# Patient Record
Sex: FEMALE | Race: WHITE | Employment: UNEMPLOYED | URBAN - METROPOLITAN AREA
[De-identification: names, ages, dates, MRNs, and addresses within clinical notes are randomized per-mention and may not be internally consistent; named-entity substitution may affect disease eponyms.]

---

## 2018-01-22 ENCOUNTER — HOSPITAL ENCOUNTER (EMERGENCY)
Age: 23
Discharge: HOME | End: 2018-01-22
Payer: MEDICAID

## 2018-01-22 VITALS
HEART RATE: 113 BPM | BODY MASS INDEX: 41.65 KG/M2 | DIASTOLIC BLOOD PRESSURE: 77 MMHG | RESPIRATION RATE: 20 BRPM | HEIGHT: 65 IN | TEMPERATURE: 98 F | WEIGHT: 250 LBS | OXYGEN SATURATION: 100 % | SYSTOLIC BLOOD PRESSURE: 130 MMHG

## 2018-01-22 DIAGNOSIS — M54.50 CHRONIC BILATERAL LOW BACK PAIN WITHOUT SCIATICA: Primary | ICD-10-CM

## 2018-01-22 DIAGNOSIS — G89.29 CHRONIC BILATERAL LOW BACK PAIN WITHOUT SCIATICA: Primary | ICD-10-CM

## 2018-01-22 PROCEDURE — 99283 EMERGENCY DEPT VISIT LOW MDM: CPT

## 2018-01-22 RX ORDER — CYCLOBENZAPRINE HCL 5 MG
5 TABLET ORAL 3 TIMES DAILY PRN
Qty: 15 TABLET | Refills: 0 | Status: SHIPPED | OUTPATIENT
Start: 2018-01-22 | End: 2018-02-02 | Stop reason: ALTCHOICE

## 2018-01-22 RX ORDER — SERTRALINE HYDROCHLORIDE 100 MG/1
100 TABLET, FILM COATED ORAL DAILY
COMMUNITY
End: 2018-05-01 | Stop reason: ALTCHOICE

## 2018-01-22 RX ORDER — METHYLPREDNISOLONE 4 MG/1
TABLET ORAL
Qty: 1 PACKAGE | Refills: 0 | Status: SHIPPED | OUTPATIENT
Start: 2018-01-22 | End: 2018-01-27

## 2018-01-23 NOTE — ED PROVIDER NOTES
Patient Seen in: THE [facility] Emergency Department In [place]    History   Patient presents with:  Back Pain (musculoskeletal)    Stated Complaint: back pain     14-year-old female who presents to the emergency room with complaints of low back pain for the noted above.     Physical Exam   ED Triage Vitals [01/22/18 9994]  BP: 130/77  Pulse: 113  Resp: 20  Temp: 97.8 °F (36.6 °C)  Temp src: Temporal  SpO2: 100 %  O2 Device: None (Room air)    Current:/77   Pulse 113   Temp 97.8 °F (36.6 °C) (Temporal) SATNAM  Counts include 234 beds at the [facility] [address]  [phone]    In 1 week  As needed, If symptoms worsen        Medications Prescribed:  Discharge Medication List as of 1/22/2018  6:08 PM    START taking these medications    methylPREDNISolone 4 MG Oral Tablet Therapy Pack  Dose

## 2018-01-24 ENCOUNTER — HOSPITAL ENCOUNTER (EMERGENCY)
Facility: HOSPITAL | Age: 23
Discharge: HOME | End: 2018-01-24
Attending: EMERGENCY MEDICINE
Payer: MEDICAID

## 2018-01-24 VITALS
SYSTOLIC BLOOD PRESSURE: 136 MMHG | RESPIRATION RATE: 18 BRPM | TEMPERATURE: 97 F | DIASTOLIC BLOOD PRESSURE: 96 MMHG | HEART RATE: 102 BPM

## 2018-01-24 DIAGNOSIS — M54.9 BACK PAIN WITHOUT RADIATION: Primary | ICD-10-CM

## 2018-01-24 PROCEDURE — 99282 EMERGENCY DEPT VISIT SF MDM: CPT

## 2018-01-25 NOTE — ED INITIAL ASSESSMENT (HPI)
Pt presents with complaint of lower back pain since Sunday pt states this is her 3rd er visit this week

## 2018-01-25 NOTE — ED PROVIDER NOTES
Patient Seen in: [facility] Emergency Department    History   Patient presents with:  Back Pain (musculoskeletal)  Patient was seen in the [facility] emergency department  Stated Complaint: Back  pain    HPI    2 days ago.   Patient was complaining o swelling or bruising. No erythema. Range of motion is limited to about 15° of flexion forward and about 30° of flexion laterally. No obvious spasm identified. No point tenderness over any spinous process.   Some mild diffuse paraspinal tenderness and te 1year-old.   I discussed with patient that if she is having difficulty finding a primary care follow-up, she may contact the ED case manager            Disposition and Plan     Clinical Impression:  Back pain without radiation  (primary encounter diagnosis

## 2018-02-02 ENCOUNTER — TELEPHONE (OUTPATIENT)
Dept: SURGERY | Facility: CLINIC | Age: 23
End: 2018-02-02

## 2018-02-02 ENCOUNTER — OFFICE VISIT (OUTPATIENT)
Dept: FAMILY MEDICINE CLINIC | Facility: CLINIC | Age: 23
End: 2018-02-02

## 2018-02-02 VITALS
HEIGHT: 65.5 IN | HEART RATE: 60 BPM | WEIGHT: 264 LBS | SYSTOLIC BLOOD PRESSURE: 118 MMHG | DIASTOLIC BLOOD PRESSURE: 76 MMHG | BODY MASS INDEX: 43.46 KG/M2

## 2018-02-02 DIAGNOSIS — M54.50 ACUTE BILATERAL LOW BACK PAIN WITHOUT SCIATICA: ICD-10-CM

## 2018-02-02 DIAGNOSIS — F32.9 REACTIVE DEPRESSION: ICD-10-CM

## 2018-02-02 DIAGNOSIS — Z00.00 ROUTINE GENERAL MEDICAL EXAMINATION AT A HEALTH CARE FACILITY: Primary | ICD-10-CM

## 2018-02-02 DIAGNOSIS — Z83.79 FAMILY HISTORY OF CELIAC DISEASE: ICD-10-CM

## 2018-02-02 DIAGNOSIS — F43.21 GRIEF REACTION: ICD-10-CM

## 2018-02-02 DIAGNOSIS — M48.061 SPINAL STENOSIS OF LUMBAR REGION WITHOUT NEUROGENIC CLAUDICATION: ICD-10-CM

## 2018-02-02 DIAGNOSIS — M51.36 DEGENERATIVE DISC DISEASE, LUMBAR: ICD-10-CM

## 2018-02-02 PROCEDURE — 99203 OFFICE O/P NEW LOW 30 MIN: CPT | Performed by: FAMILY MEDICINE

## 2018-02-02 PROCEDURE — 99385 PREV VISIT NEW AGE 18-39: CPT | Performed by: FAMILY MEDICINE

## 2018-02-02 RX ORDER — NABUMETONE 500 MG/1
500 TABLET, FILM COATED ORAL 2 TIMES DAILY PRN
Qty: 30 TABLET | Refills: 1 | Status: SHIPPED | OUTPATIENT
Start: 2018-02-02 | End: 2018-05-01 | Stop reason: ALTCHOICE

## 2018-02-02 RX ORDER — HYDROCODONE BITARTRATE AND ACETAMINOPHEN 5; 325 MG/1; MG/1
TABLET ORAL
Refills: 0 | COMMUNITY
Start: 2018-01-23 | End: 2018-05-01 | Stop reason: ALTCHOICE

## 2018-02-02 RX ORDER — TIZANIDINE 2 MG/1
TABLET ORAL
Qty: 30 TABLET | Refills: 1 | Status: SHIPPED | OUTPATIENT
Start: 2018-02-02 | End: 2018-05-01 | Stop reason: ALTCHOICE

## 2018-02-02 NOTE — PATIENT INSTRUCTIONS
-- start nabumetone as needed for back pain  -- tizanidine as needed for more severe pain (may cause drowsiness)  --coordinator will reach out to you for counseling options  --call to schedule xray and physical therapy  -- calll pain specialist for appt

## 2018-02-02 NOTE — PROGRESS NOTES
Kwaku Webb is a 24year old female who is here for Patient presents with:  Physical  Low Back Pain: Back pain started after IUD was put in       HPI:     Here for physical    Chronic back pain  -has distant history of DJD and spinal stenosis  -ne stenosis       No past surgical history on file. Social History:    Smoking status: Never Smoker                                                              Smokeless tobacco: Never Used                      Alcohol use:  Yes              Comment: occ severe pain      Nabumetone 500 MG Oral Tab 30 tablet 1      Sig: Take 1 tablet (500 mg total) by mouth 2 (two) times daily as needed for Pain.            Imaging & Referrals:  OP REFERRAL TO [facility]  OP REFERRAL PAIN MANGEMENT  SHANNON LAUREANO

## 2018-02-06 ENCOUNTER — HOSPITAL ENCOUNTER (OUTPATIENT)
Dept: GENERAL RADIOLOGY | Age: 23
Discharge: HOME | End: 2018-02-06
Attending: FAMILY MEDICINE
Payer: MEDICAID

## 2018-02-06 DIAGNOSIS — M54.50 ACUTE BILATERAL LOW BACK PAIN WITHOUT SCIATICA: ICD-10-CM

## 2018-02-06 DIAGNOSIS — M51.36 DEGENERATIVE DISC DISEASE, LUMBAR: ICD-10-CM

## 2018-02-06 PROCEDURE — 72110 X-RAY EXAM L-2 SPINE 4/>VWS: CPT | Performed by: FAMILY MEDICINE

## 2018-02-09 ENCOUNTER — OFFICE VISIT (OUTPATIENT)
Dept: PHYSICAL THERAPY | Age: 23
End: 2018-02-09
Attending: FAMILY MEDICINE
Payer: MEDICAID

## 2018-02-09 DIAGNOSIS — M54.50 ACUTE BILATERAL LOW BACK PAIN WITHOUT SCIATICA: ICD-10-CM

## 2018-02-09 DIAGNOSIS — M51.36 DEGENERATIVE DISC DISEASE, LUMBAR: ICD-10-CM

## 2018-02-09 PROCEDURE — 97110 THERAPEUTIC EXERCISES: CPT | Performed by: PHYSICAL THERAPIST

## 2018-02-09 PROCEDURE — 97162 PT EVAL MOD COMPLEX 30 MIN: CPT | Performed by: PHYSICAL THERAPIST

## 2018-02-09 NOTE — PROGRESS NOTES
SPINE EVALUATION:   Referring Physician: Dr. Darylene Hews  Diagnosis: Acute bilateral low back pain without sciatica (M54.5), Degenerative disc disease, lumbar (M51.36), Spinal stenosis of lumbar region without neurogenic claudication (M48.061) Date of Service effect (NE)        Pretest symptoms lying: prone bilateral LBP 1/10  Extension in lying (EIL)   90%   Increased left LBP 2-3/10, No worse (NW), PDM returning to prone  Repeated EIL    90%   NE    Strength: Patient is able to squat and return and heel and t over a 4-6 week period. Treatment will include: mechanical diagnosis and therapy (MDT) of the lumbar spine, therapeutic exercise, manual therapy, and home program to achieve the above goals.     Education or treatment limitation: None  Rehab Potential:good

## 2018-02-13 ENCOUNTER — OFFICE VISIT (OUTPATIENT)
Dept: PHYSICAL THERAPY | Age: 23
End: 2018-02-13
Attending: FAMILY MEDICINE
Payer: MEDICAID

## 2018-02-13 PROCEDURE — 97110 THERAPEUTIC EXERCISES: CPT | Performed by: PHYSICAL THERAPIST

## 2018-02-19 ENCOUNTER — TELEPHONE (OUTPATIENT)
Dept: FAMILY MEDICINE CLINIC | Facility: CLINIC | Age: 23
End: 2018-02-19

## 2018-02-19 NOTE — TELEPHONE ENCOUNTER
Per Dr. Hawk Tyson, the patient was informed that the x-ray showed mild disc space narrowing and to follow up in 4 weeks as planned. Patient verbalized understanding, and she states that she will call back to schedule the follow up appointment at a later time.

## 2018-02-19 NOTE — TELEPHONE ENCOUNTER
----- Message from Ankush Fountain MD sent at 2/15/2018 12:59 PM CST -----  Mild disc space narrowing on xray   -followup in 4 wks as planned

## 2018-02-20 ENCOUNTER — OFFICE VISIT (OUTPATIENT)
Dept: PHYSICAL THERAPY | Age: 23
End: 2018-02-20
Attending: FAMILY MEDICINE
Payer: MEDICAID

## 2018-02-20 PROCEDURE — 97110 THERAPEUTIC EXERCISES: CPT | Performed by: PHYSICAL THERAPIST

## 2018-02-20 NOTE — PROGRESS NOTES
Dx: Acute bilateral low back pain without sciatica (M54.5), Degenerative disc disease, lumbar (M51.36), Spinal stenosis of lumbar region without neurogenic claudication (G82.273)  Authorized # of Visits: 3/8  Next MD visit: none scheduled  Fall Risk: stand

## 2018-02-21 ENCOUNTER — LAB ENCOUNTER (OUTPATIENT)
Dept: LAB | Age: 23
End: 2018-02-21
Attending: FAMILY MEDICINE
Payer: MEDICAID

## 2018-02-21 DIAGNOSIS — Z00.00 ROUTINE GENERAL MEDICAL EXAMINATION AT A HEALTH CARE FACILITY: ICD-10-CM

## 2018-02-21 DIAGNOSIS — Z83.79 FAMILY HISTORY OF CELIAC DISEASE: ICD-10-CM

## 2018-02-21 LAB
25-HYDROXYVITAMIN D (TOTAL): 18.7 NG/ML (ref 30–100)
ALBUMIN SERPL-MCNC: 3.7 G/DL (ref 3.5–4.8)
ALP LIVER SERPL-CCNC: 83 U/L (ref 37–98)
ALT SERPL-CCNC: 21 U/L (ref 14–54)
AST SERPL-CCNC: 14 U/L (ref 15–41)
BASOPHILS # BLD AUTO: 0.02 X10(3) UL (ref 0–0.1)
BASOPHILS NFR BLD AUTO: 0.3 %
BILIRUB SERPL-MCNC: 1.1 MG/DL (ref 0.1–2)
BUN BLD-MCNC: 13 MG/DL (ref 8–20)
CALCIUM BLD-MCNC: 8.9 MG/DL (ref 8.3–10.3)
CHLORIDE: 108 MMOL/L (ref 101–111)
CHOLEST SMN-MCNC: 164 MG/DL (ref ?–190)
CO2: 27 MMOL/L (ref 22–32)
CREAT BLD-MCNC: 0.59 MG/DL (ref 0.55–1.02)
EOSINOPHIL # BLD AUTO: 0.09 X10(3) UL (ref 0–0.3)
EOSINOPHIL NFR BLD AUTO: 1.3 %
ERYTHROCYTE [DISTWIDTH] IN BLOOD BY AUTOMATED COUNT: 13.9 % (ref 11.5–16)
GLUCOSE BLD-MCNC: 87 MG/DL (ref 70–99)
HCT VFR BLD AUTO: 41.1 % (ref 34–50)
HDLC SERPL-MCNC: 45 MG/DL (ref 45–?)
HDLC SERPL: 3.64 {RATIO} (ref ?–4.44)
HGB BLD-MCNC: 12.9 G/DL (ref 12–16)
IMMATURE GRANULOCYTE COUNT: 0.01 X10(3) UL (ref 0–1)
IMMATURE GRANULOCYTE RATIO %: 0.1 %
IMMUNOGLOBULIN A: 227 MG/DL (ref 70–312)
LDLC SERPL CALC-MCNC: 101 MG/DL (ref ?–120)
LYMPHOCYTES # BLD AUTO: 1.52 X10(3) UL (ref 0.9–4)
LYMPHOCYTES NFR BLD AUTO: 21.2 %
M PROTEIN MFR SERPL ELPH: 7.2 G/DL (ref 6.1–8.3)
MCH RBC QN AUTO: 27.4 PG (ref 27–33.2)
MCHC RBC AUTO-ENTMCNC: 31.4 G/DL (ref 31–37)
MCV RBC AUTO: 87.3 FL (ref 81–100)
MONOCYTES # BLD AUTO: 0.48 X10(3) UL (ref 0.1–1)
MONOCYTES NFR BLD AUTO: 6.7 %
NEUTROPHIL ABS PRELIM: 5.05 X10 (3) UL (ref 1.3–6.7)
NEUTROPHILS # BLD AUTO: 5.05 X10(3) UL (ref 1.3–6.7)
NEUTROPHILS NFR BLD AUTO: 70.4 %
NONHDLC SERPL-MCNC: 119 MG/DL (ref ?–150)
PLATELET # BLD AUTO: 304 10(3)UL (ref 150–450)
POTASSIUM SERPL-SCNC: 4.2 MMOL/L (ref 3.6–5.1)
RBC # BLD AUTO: 4.71 X10(6)UL (ref 3.8–5.1)
RED CELL DISTRIBUTION WIDTH-SD: 44 FL (ref 35.1–46.3)
SODIUM SERPL-SCNC: 141 MMOL/L (ref 136–144)
TRIGL SERPL-MCNC: 92 MG/DL (ref ?–115)
TSI SER-ACNC: 0.83 MIU/ML (ref 0.35–5.5)
VLDLC SERPL CALC-MCNC: 18 MG/DL (ref 5–40)
WBC # BLD AUTO: 7.2 X10(3) UL (ref 4–13)

## 2018-02-21 PROCEDURE — 80061 LIPID PANEL: CPT | Performed by: FAMILY MEDICINE

## 2018-02-21 PROCEDURE — 86256 FLUORESCENT ANTIBODY TITER: CPT | Performed by: FAMILY MEDICINE

## 2018-02-21 PROCEDURE — 83516 IMMUNOASSAY NONANTIBODY: CPT | Performed by: FAMILY MEDICINE

## 2018-02-21 PROCEDURE — 80050 GENERAL HEALTH PANEL: CPT | Performed by: FAMILY MEDICINE

## 2018-02-21 PROCEDURE — 82306 VITAMIN D 25 HYDROXY: CPT | Performed by: FAMILY MEDICINE

## 2018-02-21 PROCEDURE — 82784 ASSAY IGA/IGD/IGG/IGM EACH: CPT | Performed by: FAMILY MEDICINE

## 2018-02-21 PROCEDURE — 36415 COLL VENOUS BLD VENIPUNCTURE: CPT | Performed by: FAMILY MEDICINE

## 2018-02-22 LAB
GLIAD (DEAMIDATED) AB, IGA: NEGATIVE
GLIAD (DEAMIDATED) AB, IGG: NEGATIVE
TISSUE TRANSGLUTAMINASE AB,IGA: 6.3 U/ML (ref ?–15)
TISSUE TRANSGLUTAMINASE IGA QUALITATIVE: NEGATIVE

## 2018-02-23 ENCOUNTER — TELEPHONE (OUTPATIENT)
Dept: FAMILY MEDICINE CLINIC | Facility: CLINIC | Age: 23
End: 2018-02-23

## 2018-02-23 NOTE — TELEPHONE ENCOUNTER
The labs are still \"in process\" and explained will call patient or send her the info via her mychart and pt verbalized understanding.

## 2018-02-23 NOTE — TELEPHONE ENCOUNTER
Pal Lopez is looking for her bloodwork results she had on Wed.  She would like Dr Seb Mckeon to sent them through my chart or call her at [phone]

## 2018-02-26 ENCOUNTER — TELEPHONE (OUTPATIENT)
Dept: FAMILY MEDICINE CLINIC | Facility: CLINIC | Age: 23
End: 2018-02-26

## 2018-02-26 NOTE — TELEPHONE ENCOUNTER
The patient was informed of her test results and Dr. Winsome Grajeda recommendations. Patient states that she will call back to schedule follow-up appointment. abrasion

## 2018-02-26 NOTE — TELEPHONE ENCOUNTER
----- Message from Gordon Durant MD sent at 2/25/2018 12:00 PM CST -----  Normal labs  -negative celiac testing  -followup as planned

## 2018-03-13 ENCOUNTER — APPOINTMENT (OUTPATIENT)
Dept: PHYSICAL THERAPY | Age: 23
End: 2018-03-13
Attending: FAMILY MEDICINE
Payer: MEDICAID

## 2018-05-01 NOTE — PATIENT INSTRUCTIONS
-- start fluoxetine 10mg daily for 4 days, then increase to 2 tabs (20mg) daily  -- if personality seems to be changing, let us know  -- otherwise, followup in 2 weeks, sooner if needed  -- navigator will reach out to you for counseling and psychiatris

## 2018-05-01 NOTE — PROGRESS NOTES
Mir Ford is a 24year old female here for Patient presents with:  Depression: The patient has been dealing with depression and anxiety for approximately 3 months   Anxiety      HPI:     1.  Moderate episode of recurrent major depressive disorder ( Prescriptions:  FLUoxetine HCl 10 MG Oral Cap Take 1 capsule (10 mg total) by mouth daily.  Disp: 30 capsule Rfl: 2       Allergies:    Augmentin [Amoxicil*          ROS:     --GEN: Denies  --HEENT: Denies  --RESP: Denies  --CV: Denies  --GI: Denies  --: MD SOLARES spent a total of 40 minutes, half of which was spent counseling/coordinating care regarding depression/anxiety

## 2018-05-04 ENCOUNTER — OFFICE VISIT (OUTPATIENT)
Dept: FAMILY MEDICINE CLINIC | Facility: CLINIC | Age: 23
End: 2018-05-04

## 2018-05-04 VITALS
WEIGHT: 254 LBS | DIASTOLIC BLOOD PRESSURE: 72 MMHG | HEART RATE: 104 BPM | SYSTOLIC BLOOD PRESSURE: 114 MMHG | HEIGHT: 65.5 IN | BODY MASS INDEX: 41.81 KG/M2

## 2018-05-04 DIAGNOSIS — T83.32XA INTRAUTERINE DEVICE (IUD) MIGRATION, INITIAL ENCOUNTER: ICD-10-CM

## 2018-05-04 DIAGNOSIS — Z30.015 ENCOUNTER FOR INITIAL PRESCRIPTION OF VAGINAL RING HORMONAL CONTRACEPTIVE: Primary | ICD-10-CM

## 2018-05-04 PROCEDURE — 58301 REMOVE INTRAUTERINE DEVICE: CPT | Performed by: FAMILY MEDICINE

## 2018-05-04 PROCEDURE — 99214 OFFICE O/P EST MOD 30 MIN: CPT | Performed by: FAMILY MEDICINE

## 2018-05-04 RX ORDER — ETONOGESTREL AND ETHINYL ESTRADIOL 11.7; 2.7 MG/1; MG/1
INSERT, EXTENDED RELEASE VAGINAL
Qty: 3 EACH | Refills: 3 | Status: SHIPPED | OUTPATIENT
Start: 2018-05-04 | End: 2019-06-11

## 2018-05-04 NOTE — PROGRESS NOTES
Rachel Rivera is a 24year old female here for Patient presents with:  Contraception: Patient would like to start nuva ring   IUD: IUD removal      HPI:     1. Encounter for initial prescription of vaginal ring hormonal contraceptive  2.  Intrauterine RRR, no murmurs  : normal external female anatomy, clean/smooth cervix, string visualized  Psych: normal affect     ASSESSMENT/PLAN:     1.  Encounter for initial prescription of vaginal ring hormonal contraceptive  -start nuvaring  -counseled given  -ris

## 2018-05-04 NOTE — PATIENT INSTRUCTIONS
- start nuvaring at start of next cycle  -use barrier protection for 1-2 wks after starting  -followup in 2 wks as planned

## 2018-05-15 NOTE — PATIENT INSTRUCTIONS
-- start fluoxetine 20mg caps daily  -- followup with psychiatrist as planned next week  -- conitnue counseling  -- followup here in 6 wks, sooner if needed

## 2018-05-15 NOTE — PROGRESS NOTES
Rosina Mercer is a 24year old female here for Patient presents with: Follow - Up: Depression follow-up      HPI:       1. Moderate episode of recurrent major depressive disorder ([address]  2.  Anxiety  -slight improvement since increasing fluoxetine to 2 murmurs  Ext: full ROM  Psych: normal affect     ASSESSMENT/PLAN:     1. Moderate episode of recurrent major depressive disorder ([facility] [address]  2.  Anxiety  -slightly improved  -c/w fluoxetine 20mg for additional 2-4 wks  -would consider increasing at that point  -

## 2018-06-13 ENCOUNTER — TELEPHONE (OUTPATIENT)
Dept: FAMILY MEDICINE CLINIC | Facility: CLINIC | Age: 23
End: 2018-06-13

## 2018-06-13 NOTE — TELEPHONE ENCOUNTER
Have her come in to further discuss how she is doing before going up to 40mg    Continue 20mg until she sees me. If takes med in AM and is tired from it, can try taking before bed.     Thanks

## 2018-06-13 NOTE — TELEPHONE ENCOUNTER
[place] has a question regarding her generic prozac, she would like a nurse to call her at [phone]

## 2018-06-13 NOTE — TELEPHONE ENCOUNTER
Spoke to patient and she is on generic of prozac. Doing good first couple days on 20mg but lately feeling more tired and having down days.   Has been on this dose for about 1 month  States it has been harder for her to get up in the morning because she fee

## 2018-06-14 NOTE — TELEPHONE ENCOUNTER
Spoke to patient with instructions and she said she had also taken it at night and was still feeling this way.   She made appt for 6/22/18 at 9:30a

## 2018-06-27 NOTE — PROGRESS NOTES
Judy Ortiz is a 24year old female here for Patient presents with:  Depression: Rm. 1: Prozac      HPI:       1. Moderate episode of recurrent major depressive disorder ([facility] [address]  2.  Anxiety  -was initially doing well on prozac, but now having signific murmurs  Abd: soft, ND, NT, +BS  Ext: full ROM  Psych: normal affect     ASSESSMENT/PLAN:     1. Moderate episode of recurrent major depressive disorder ([facility] [address]  2.  Anxiety  -uncontrolled  -not tolerating prozac  -stop this  -start sertraline  -risks and lazaro

## 2018-06-27 NOTE — PATIENT INSTRUCTIONS
Stop fluoxetine    Start 1/2 tab sertraline tonight and take 1/2 tab for next 4 nights at least    Increase to a full tab after that    If things slowly improving, come back and see me in 1 month    If no significant difference, but doing ok on it, touch b

## 2018-07-03 ENCOUNTER — TELEPHONE (OUTPATIENT)
Dept: FAMILY MEDICINE CLINIC | Facility: CLINIC | Age: 23
End: 2018-07-03

## 2018-07-03 NOTE — TELEPHONE ENCOUNTER
Pt states she had a panic attack yesterday and took her last xanax pill yesterday.  States her previous PCP gave her the last RX Xanax and would Dr. Rachel Van to give her a new RX Xanax, she uses the [facility] in [place] on [address]

## 2018-07-03 NOTE — TELEPHONE ENCOUNTER
LM for patient advising her that this rx needs to come from her psychiatrist- per Dr Sasha Mauro I have personally seen and examined this patient.  I have fully participated in the care of this patient. I have reviewed all pertinent clinical information, including history, physical exam, plan and the Resident’s note and agree except as noted.

## 2018-07-16 ENCOUNTER — APPOINTMENT (OUTPATIENT)
Dept: GENERAL RADIOLOGY | Age: 23
End: 2018-07-16
Attending: EMERGENCY MEDICINE
Payer: MEDICAID

## 2018-07-16 ENCOUNTER — HOSPITAL ENCOUNTER (EMERGENCY)
Age: 23
Discharge: HOME | End: 2018-07-16
Attending: EMERGENCY MEDICINE
Payer: MEDICAID

## 2018-07-16 VITALS
HEART RATE: 89 BPM | RESPIRATION RATE: 20 BRPM | WEIGHT: 250 LBS | OXYGEN SATURATION: 97 % | HEIGHT: 65 IN | DIASTOLIC BLOOD PRESSURE: 58 MMHG | BODY MASS INDEX: 41.65 KG/M2 | SYSTOLIC BLOOD PRESSURE: 107 MMHG | TEMPERATURE: 99 F

## 2018-07-16 DIAGNOSIS — J02.0 STREPTOCOCCAL SORE THROAT: Primary | ICD-10-CM

## 2018-07-16 LAB
BASOPHILS # BLD AUTO: 0.03 X10(3) UL (ref 0–0.1)
BASOPHILS NFR BLD AUTO: 0.2 %
EOSINOPHIL # BLD AUTO: 0.08 X10(3) UL (ref 0–0.3)
EOSINOPHIL NFR BLD AUTO: 0.5 %
ERYTHROCYTE [DISTWIDTH] IN BLOOD BY AUTOMATED COUNT: 13.5 % (ref 11.5–16)
HCT VFR BLD AUTO: 40.3 % (ref 34–50)
HGB BLD-MCNC: 13.1 G/DL (ref 12–16)
IMMATURE GRANULOCYTE COUNT: 0.06 X10(3) UL (ref 0–1)
IMMATURE GRANULOCYTE RATIO %: 0.4 %
LYMPHOCYTES # BLD AUTO: 1.14 X10(3) UL (ref 0.9–4)
LYMPHOCYTES NFR BLD AUTO: 7.7 %
MCH RBC QN AUTO: 27.8 PG (ref 27–33.2)
MCHC RBC AUTO-ENTMCNC: 32.5 G/DL (ref 31–37)
MCV RBC AUTO: 85.4 FL (ref 81–100)
MONOCYTES # BLD AUTO: 0.65 X10(3) UL (ref 0.1–1)
MONOCYTES NFR BLD AUTO: 4.4 %
MONOSCREEN: NEGATIVE
NEUTROPHIL ABS PRELIM: 12.91 X10 (3) UL (ref 1.3–6.7)
NEUTROPHILS # BLD AUTO: 12.91 X10(3) UL (ref 1.3–6.7)
NEUTROPHILS NFR BLD AUTO: 86.8 %
PLATELET # BLD AUTO: 250 10(3)UL (ref 150–450)
RBC # BLD AUTO: 4.72 X10(6)UL (ref 3.8–5.1)
RED CELL DISTRIBUTION WIDTH-SD: 42.2 FL (ref 35.1–46.3)
WBC # BLD AUTO: 14.9 X10(3) UL (ref 4–13)

## 2018-07-16 PROCEDURE — 99284 EMERGENCY DEPT VISIT MOD MDM: CPT

## 2018-07-16 PROCEDURE — 86403 PARTICLE AGGLUT ANTBDY SCRN: CPT | Performed by: EMERGENCY MEDICINE

## 2018-07-16 PROCEDURE — 96375 TX/PRO/DX INJ NEW DRUG ADDON: CPT

## 2018-07-16 PROCEDURE — 85025 COMPLETE CBC W/AUTO DIFF WBC: CPT | Performed by: EMERGENCY MEDICINE

## 2018-07-16 PROCEDURE — 96365 THER/PROPH/DIAG IV INF INIT: CPT

## 2018-07-16 PROCEDURE — 70360 X-RAY EXAM OF NECK: CPT | Performed by: EMERGENCY MEDICINE

## 2018-07-16 PROCEDURE — 87430 STREP A AG IA: CPT | Performed by: EMERGENCY MEDICINE

## 2018-07-16 RX ORDER — AMOXICILLIN 875 MG/1
875 TABLET, COATED ORAL 2 TIMES DAILY
Qty: 20 TABLET | Refills: 0 | Status: SHIPPED | OUTPATIENT
Start: 2018-07-16 | End: 2018-07-26

## 2018-07-16 RX ORDER — DEXAMETHASONE SODIUM PHOSPHATE 4 MG/ML
10 VIAL (ML) INJECTION ONCE
Status: COMPLETED | OUTPATIENT
Start: 2018-07-16 | End: 2018-07-16

## 2018-07-16 NOTE — ED PROVIDER NOTES
Patient Seen in: [facility] Emergency Department In [place]    History   Patient presents with: Tonsillitis    Stated Complaint: SWOLLEN TONSILS PER PT    HPI    Patient is a 60-year-old female presenting for evaluation of sore throat.     Patient states respirations are unlabored. HEART: Tachycardic and regular. There are no rubs or gallops. ABDOMEN: The abdomen is soft, nondistended, nontender. Bowel sounds present. SKIN: Skin is pink warm and dry. There are no rashes.    EXTREMITIES: The patient mov [id number]  ------------------------------------------------------------      MDM     Patient was placed on a cart, an IV was established, and blood was drawn and sent to the laboratory for further evaluation. An infusion of normal saline was initiated.  Patient

## 2018-08-09 ENCOUNTER — TELEPHONE (OUTPATIENT)
Dept: FAMILY MEDICINE CLINIC | Facility: CLINIC | Age: 23
End: 2018-08-09

## 2018-08-09 NOTE — TELEPHONE ENCOUNTER
Patient directed to walk-in clinic.  Patient given contact # for walkin clinics to confirm that they accept her insurance

## 2018-08-09 NOTE — TELEPHONE ENCOUNTER
Pt states she still has a sore throat after having strep. She said it feels like it is starting all over again. She feels likes she just wants to rip her tonsils out. She said it is swollen on one side and very painful.

## 2018-10-01 ENCOUNTER — OFFICE VISIT (OUTPATIENT)
Dept: FAMILY MEDICINE CLINIC | Facility: CLINIC | Age: 23
End: 2018-10-01
Payer: MEDICAID

## 2018-10-01 VITALS
DIASTOLIC BLOOD PRESSURE: 70 MMHG | WEIGHT: 265 LBS | BODY MASS INDEX: 43.62 KG/M2 | SYSTOLIC BLOOD PRESSURE: 118 MMHG | HEIGHT: 65.55 IN | HEART RATE: 90 BPM

## 2018-10-01 DIAGNOSIS — F41.9 ANXIETY: ICD-10-CM

## 2018-10-01 DIAGNOSIS — M54.50 CHRONIC BILATERAL LOW BACK PAIN WITHOUT SCIATICA: Primary | ICD-10-CM

## 2018-10-01 DIAGNOSIS — G89.29 CHRONIC BILATERAL LOW BACK PAIN WITHOUT SCIATICA: Primary | ICD-10-CM

## 2018-10-01 DIAGNOSIS — M47.896 OTHER OSTEOARTHRITIS OF SPINE, LUMBAR REGION: ICD-10-CM

## 2018-10-01 DIAGNOSIS — M48.061 SPINAL STENOSIS OF LUMBAR REGION WITHOUT NEUROGENIC CLAUDICATION: ICD-10-CM

## 2018-10-01 DIAGNOSIS — F33.1 MODERATE EPISODE OF RECURRENT MAJOR DEPRESSIVE DISORDER (HCC): ICD-10-CM

## 2018-10-01 PROCEDURE — 99214 OFFICE O/P EST MOD 30 MIN: CPT | Performed by: FAMILY MEDICINE

## 2018-10-01 RX ORDER — TIZANIDINE 2 MG/1
TABLET ORAL
Qty: 30 TABLET | Refills: 1 | Status: SHIPPED | OUTPATIENT
Start: 2018-10-01 | End: 2019-06-11

## 2018-10-01 RX ORDER — SERTRALINE HYDROCHLORIDE 100 MG/1
100 TABLET, FILM COATED ORAL DAILY
Qty: 90 TABLET | Refills: 0 | Status: SHIPPED | OUTPATIENT
Start: 2018-10-01 | End: 2019-06-11

## 2018-10-01 RX ORDER — NABUMETONE 500 MG/1
500 TABLET, FILM COATED ORAL 2 TIMES DAILY PRN
Qty: 30 TABLET | Refills: 1 | Status: SHIPPED | OUTPATIENT
Start: 2018-10-01 | End: 2019-06-11

## 2018-10-01 NOTE — PATIENT INSTRUCTIONS
-- start nabumetone as needed with food  -- tizanidine as needed for severe pain  -- call to schedule PT and pain clinic  -- bring CD of MRI with you to pain clinic  -- followup in 4-6 wks

## 2018-10-01 NOTE — PROGRESS NOTES
Anastacia Reyna is a 24year old female here for Patient presents with:   Insomnia  Low Back Pain: x 2 weeks  Anxiety      HPI:     Back pain  -started 2 wks ago when lifting her son  -in bilateral lumbar back  -radiation to none  -no numbness or tinglin Ht 65.55\"   Wt 265 lb   BMI 43.36 kg/m²     Gen: NAD, alert and oriented x 3  HEENT: NCAT, PERRL  Pulm: CTAB, no wheezing  CV: RRR, no murmurs  Abd: soft, ND, NT, +BS  Ext: full ROM, normal strength, no edema  MSK: no deformity, no spinal tenderness, musc

## 2018-10-05 ENCOUNTER — OFFICE VISIT (OUTPATIENT)
Dept: PAIN CLINIC | Facility: CLINIC | Age: 23
End: 2018-10-05
Payer: MEDICAID

## 2018-10-05 VITALS
BODY MASS INDEX: 43.62 KG/M2 | HEIGHT: 65.55 IN | SYSTOLIC BLOOD PRESSURE: 122 MMHG | HEART RATE: 74 BPM | DIASTOLIC BLOOD PRESSURE: 82 MMHG | OXYGEN SATURATION: 94 % | WEIGHT: 265 LBS

## 2018-10-05 DIAGNOSIS — G89.29 CHRONIC BILATERAL LOW BACK PAIN WITHOUT SCIATICA: ICD-10-CM

## 2018-10-05 DIAGNOSIS — M51.36 DDD (DEGENERATIVE DISC DISEASE), LUMBAR: Primary | ICD-10-CM

## 2018-10-05 DIAGNOSIS — M54.50 CHRONIC BILATERAL LOW BACK PAIN WITHOUT SCIATICA: ICD-10-CM

## 2018-10-05 PROCEDURE — 99203 OFFICE O/P NEW LOW 30 MIN: CPT | Performed by: ANESTHESIOLOGY

## 2018-10-05 NOTE — PROGRESS NOTES
HPI    Review of Systems      Physical Exam   Constitutional:           ID#[id number]    bilateral low back pain, mainly on left side. 4/10 reported pain. In room with 2 male child sons.     Location of Pain: bilateral low back    Date Pain Began: not indicated

## 2018-10-05 NOTE — PATIENT INSTRUCTIONS
Refill policies:    • Allow 2-3 business days for refills; controlled substances may take longer.   • Contact your pharmacy at least 5 days prior to running out of medication and have them send an electronic request or submit request through the “request re entire amount billed. Precertification and Prior Authorizations: If your physician has recommended that you have a procedure or additional testing performed.   [facility]) will contact your insurance carrier to obtain pre-certi

## 2018-10-05 NOTE — H&P
Name: Yulia Jalloh   : 1995   DOS: 10/5/2018     Chief complaint: Low back pain    History of present illness:  Yulia Jalloh is a 24year old female with a history of chronic low back pain since high school who presents today for evaluat 0.0 - 0.6 oz      Comment: occ    Drug use: Yes      Frequency: 7.0 times per week      Types: Cannabis      Comment: last use 10. 1.18      Review of  other systems:  A 10 point ROS is otherwise negative      Physical examination: Wally Yancey is a 24year old any imaging currently. Based upon his symptoms, I believe she may have exacerbation of myofascial low back pain. The patient is currently on nonsteroidals and has been referred to physical therapy.   I certainly would not prescribe narcotics for nonmalign

## 2019-05-18 ENCOUNTER — HOSPITAL ENCOUNTER (EMERGENCY)
Age: 24
Discharge: HOME | End: 2019-05-19
Attending: EMERGENCY MEDICINE
Payer: COMMERCIAL

## 2019-05-18 DIAGNOSIS — M79.10 MUSCLE PAIN: Primary | ICD-10-CM

## 2019-05-18 PROCEDURE — 81025 URINE PREGNANCY TEST: CPT

## 2019-05-18 PROCEDURE — 99284 EMERGENCY DEPT VISIT MOD MDM: CPT

## 2019-05-18 PROCEDURE — 96374 THER/PROPH/DIAG INJ IV PUSH: CPT

## 2019-05-19 VITALS
OXYGEN SATURATION: 98 % | HEART RATE: 68 BPM | TEMPERATURE: 98 F | DIASTOLIC BLOOD PRESSURE: 50 MMHG | BODY MASS INDEX: 42.49 KG/M2 | HEIGHT: 65 IN | RESPIRATION RATE: 16 BRPM | WEIGHT: 255 LBS | SYSTOLIC BLOOD PRESSURE: 94 MMHG

## 2019-05-19 PROCEDURE — 86403 PARTICLE AGGLUT ANTBDY SCRN: CPT | Performed by: EMERGENCY MEDICINE

## 2019-05-19 PROCEDURE — 86431 RHEUMATOID FACTOR QUANT: CPT | Performed by: EMERGENCY MEDICINE

## 2019-05-19 PROCEDURE — 82550 ASSAY OF CK (CPK): CPT | Performed by: EMERGENCY MEDICINE

## 2019-05-19 PROCEDURE — 86038 ANTINUCLEAR ANTIBODIES: CPT | Performed by: EMERGENCY MEDICINE

## 2019-05-19 PROCEDURE — 80053 COMPREHEN METABOLIC PANEL: CPT | Performed by: EMERGENCY MEDICINE

## 2019-05-19 PROCEDURE — 85025 COMPLETE CBC W/AUTO DIFF WBC: CPT | Performed by: EMERGENCY MEDICINE

## 2019-05-19 PROCEDURE — 81003 URINALYSIS AUTO W/O SCOPE: CPT | Performed by: EMERGENCY MEDICINE

## 2019-05-19 PROCEDURE — 86060 ANTISTREPTOLYSIN O TITER: CPT | Performed by: EMERGENCY MEDICINE

## 2019-05-19 PROCEDURE — 86140 C-REACTIVE PROTEIN: CPT | Performed by: EMERGENCY MEDICINE

## 2019-05-19 PROCEDURE — 85652 RBC SED RATE AUTOMATED: CPT | Performed by: EMERGENCY MEDICINE

## 2019-05-19 RX ORDER — CYCLOBENZAPRINE HCL 10 MG
10 TABLET ORAL 3 TIMES DAILY PRN
Qty: 20 TABLET | Refills: 0 | Status: SHIPPED | OUTPATIENT
Start: 2019-05-19 | End: 2019-05-26

## 2019-05-19 RX ORDER — KETOROLAC TROMETHAMINE 30 MG/ML
30 INJECTION, SOLUTION INTRAMUSCULAR; INTRAVENOUS ONCE
Status: COMPLETED | OUTPATIENT
Start: 2019-05-19 | End: 2019-05-19

## 2019-05-19 NOTE — ED PROVIDER NOTES
Patient Seen in: THE [facility] Emergency Department In [place]    History   Patient presents with:  Muscle Pain    Stated Complaint: generalized muscle aches x4 days    HPI    Worsening body aches over the past 3 to 4 days.   This is mainly to the left arm an conjunctivae normal.  No scleral icterus. Oropharynx moist.  Throat grossly normal.  Neck: Supple without adenopathy. No posterior adenopathy.   Thyroid not palpable  Lungs: Clear  Heart: Apical pulse 72 and regular without murmur  Abdomen: Soft nontender am    Follow-up:  [address]  [address] [facility]  [phone]    Call          Medications Prescribed:  Discharge Medication List as of 5/19/2019  3:08 AM    START taking these medications    Cyclobenzaprine HCl 10 MG

## 2019-05-23 ENCOUNTER — HOSPITAL ENCOUNTER (EMERGENCY)
Age: 24
Discharge: HOME | End: 2019-05-23
Attending: EMERGENCY MEDICINE
Payer: COMMERCIAL

## 2019-05-23 ENCOUNTER — APPOINTMENT (OUTPATIENT)
Dept: GENERAL RADIOLOGY | Age: 24
End: 2019-05-23
Attending: EMERGENCY MEDICINE
Payer: COMMERCIAL

## 2019-05-23 VITALS
HEART RATE: 98 BPM | BODY MASS INDEX: 41.65 KG/M2 | DIASTOLIC BLOOD PRESSURE: 78 MMHG | TEMPERATURE: 98 F | HEIGHT: 65 IN | RESPIRATION RATE: 16 BRPM | SYSTOLIC BLOOD PRESSURE: 131 MMHG | OXYGEN SATURATION: 97 % | WEIGHT: 250 LBS

## 2019-05-23 DIAGNOSIS — M25.531 RIGHT WRIST PAIN: Primary | ICD-10-CM

## 2019-05-23 PROCEDURE — 73110 X-RAY EXAM OF WRIST: CPT | Performed by: EMERGENCY MEDICINE

## 2019-05-23 PROCEDURE — 99283 EMERGENCY DEPT VISIT LOW MDM: CPT

## 2019-05-23 NOTE — ED PROVIDER NOTES
Patient Seen in: [facility] Emergency Department In [place]    History   Patient presents with:  Wrist Pain    Stated Complaint: Right wrist pain    HPI    Patient is a 44-year-old right-hand-dominant female who presents emergency room with a history of s well-nourished female sitting up breathing easily in no apparent distress. Patient is nontoxic in appearance. EXTREMITIES: There is no cyanosis, clubbing, or edema appreciated. Pulses are 2+ and equal in both upper extremities.   There is focal tenderness

## 2019-06-11 ENCOUNTER — OFFICE VISIT (OUTPATIENT)
Dept: FAMILY MEDICINE CLINIC | Facility: CLINIC | Age: 24
End: 2019-06-11
Payer: MEDICAID

## 2019-06-11 VITALS
WEIGHT: 268.25 LBS | HEIGHT: 65.55 IN | BODY MASS INDEX: 44.16 KG/M2 | DIASTOLIC BLOOD PRESSURE: 60 MMHG | SYSTOLIC BLOOD PRESSURE: 100 MMHG | TEMPERATURE: 99 F | HEART RATE: 98 BPM

## 2019-06-11 DIAGNOSIS — F41.9 ANXIETY: ICD-10-CM

## 2019-06-11 DIAGNOSIS — M25.60 JOINT STIFFNESS: ICD-10-CM

## 2019-06-11 DIAGNOSIS — M13.0 POLYARTHRITIS: Primary | ICD-10-CM

## 2019-06-11 DIAGNOSIS — Z30.09 ENCOUNTER FOR COUNSELING REGARDING CONTRACEPTION: ICD-10-CM

## 2019-06-11 DIAGNOSIS — Z30.015 ENCOUNTER FOR INITIAL PRESCRIPTION OF VAGINAL RING HORMONAL CONTRACEPTIVE: ICD-10-CM

## 2019-06-11 DIAGNOSIS — F33.1 MODERATE EPISODE OF RECURRENT MAJOR DEPRESSIVE DISORDER (HCC): ICD-10-CM

## 2019-06-11 PROCEDURE — 81025 URINE PREGNANCY TEST: CPT | Performed by: FAMILY MEDICINE

## 2019-06-11 PROCEDURE — 99215 OFFICE O/P EST HI 40 MIN: CPT | Performed by: FAMILY MEDICINE

## 2019-06-11 RX ORDER — MELOXICAM 15 MG/1
TABLET ORAL
Qty: 30 TABLET | Refills: 1 | Status: SHIPPED | OUTPATIENT
Start: 2019-06-11 | End: 2019-07-16

## 2019-06-11 RX ORDER — DULOXETIN HYDROCHLORIDE 30 MG/1
30 CAPSULE, DELAYED RELEASE ORAL DAILY
Qty: 90 CAPSULE | Refills: 0 | Status: SHIPPED | OUTPATIENT
Start: 2019-06-11 | End: 2019-07-29

## 2019-06-11 RX ORDER — ETONOGESTREL AND ETHINYL ESTRADIOL 11.7; 2.7 MG/1; MG/1
INSERT, EXTENDED RELEASE VAGINAL
Qty: 3 EACH | Refills: 3 | Status: SHIPPED | OUTPATIENT
Start: 2019-06-11 | End: 2020-07-21

## 2019-06-11 NOTE — PROGRESS NOTES
Brittni Morales is a 21year old female here for Patient presents with:  Contraception: Patient wants 3 year arm implant. Depression: Patient wants a different medication, current medication is not working.   Joint Pain: Right Wrist pain, left hip, left Tab 1/2 - 1 tab daily as needed for pain Disp: 30 tablet Rfl: 1       Allergies:    Augmentin [Amoxicil*          ROS:     --GEN: Denies  --HEENT: Denies  --RESP: Denies  --CV: Denies  --GI: Denies  --: Denies  --MSK: Denies  --NEURO: Denies  --PSYCH: [facility] 1/2 - 1 tab daily as needed for pain       Imaging & Referrals:  Tamika Raphael MD    I spent a total of 40 minutes, more than half of which was spent counseling/coordinating care regarding joint pain, [place]

## 2019-06-11 NOTE — PATIENT INSTRUCTIONS
Stop sertraline  Start cymbalta once daily    Stop ibuprofen  Start meloxicam as needed    Call to schedule with rheumatologist for joint pain  Call to schedule with OBGYN (can also check with your old OB if she takes your insurance)    Followup in 1 mon heart risks and saving money. Keep this list and review it whenever you feel like smoking. · Get support. Let your friends know you may call them to chat when you have an urge to smoke.   · If you’ve tried to quit before without success, this time avoid th

## 2019-07-16 DIAGNOSIS — G89.29 CHRONIC BILATERAL LOW BACK PAIN WITHOUT SCIATICA: Primary | ICD-10-CM

## 2019-07-16 DIAGNOSIS — M54.50 CHRONIC BILATERAL LOW BACK PAIN WITHOUT SCIATICA: Primary | ICD-10-CM

## 2019-07-16 RX ORDER — MELOXICAM 15 MG/1
TABLET ORAL
Qty: 15 TABLET | Refills: 0 | Status: SHIPPED | OUTPATIENT
Start: 2019-07-16 | End: 2020-07-21 | Stop reason: ALTCHOICE

## 2019-07-16 NOTE — TELEPHONE ENCOUNTER
Pt requesting refill of meloxicam, refill approved for qty#15, sent to pharmacy    Last Time Medication was Filled:  6/11/19 qty. 27    Last Office Visit with PCP: 6/11/19 and patient was to follow up in 1 month    No future appointments scheduled.      Malissa Up

## 2019-07-29 ENCOUNTER — OFFICE VISIT (OUTPATIENT)
Dept: FAMILY MEDICINE CLINIC | Facility: CLINIC | Age: 24
End: 2019-07-29
Payer: MEDICAID

## 2019-07-29 VITALS
SYSTOLIC BLOOD PRESSURE: 108 MMHG | OXYGEN SATURATION: 97 % | BODY MASS INDEX: 43.36 KG/M2 | WEIGHT: 263.38 LBS | TEMPERATURE: 98 F | HEART RATE: 97 BPM | DIASTOLIC BLOOD PRESSURE: 70 MMHG | HEIGHT: 65.55 IN

## 2019-07-29 DIAGNOSIS — M25.60 JOINT STIFFNESS: ICD-10-CM

## 2019-07-29 DIAGNOSIS — F33.1 MODERATE EPISODE OF RECURRENT MAJOR DEPRESSIVE DISORDER (HCC): ICD-10-CM

## 2019-07-29 DIAGNOSIS — M13.0 POLYARTHRITIS: Primary | ICD-10-CM

## 2019-07-29 DIAGNOSIS — F41.9 ANXIETY: ICD-10-CM

## 2019-07-29 PROCEDURE — 99214 OFFICE O/P EST MOD 30 MIN: CPT | Performed by: FAMILY MEDICINE

## 2019-07-29 RX ORDER — DULOXETIN HYDROCHLORIDE 60 MG/1
60 CAPSULE, DELAYED RELEASE ORAL DAILY
Qty: 90 CAPSULE | Refills: 0 | Status: SHIPPED | OUTPATIENT
Start: 2019-07-29 | End: 2020-07-21 | Stop reason: ALTCHOICE

## 2019-07-29 NOTE — PATIENT INSTRUCTIONS
Increase cymbalta to 60mg daily    Try to schedule with rheumatologist at your convenience    Start exercises for plantars fascitis to see if foot pain improves  Use shoes with good arches or inserts    Followup in 6-8 wks, sooner if needed    Kicking the list and review it whenever you feel like smoking. · Get support. Let your friends know you may call them to chat when you have an urge to smoke. · If you’ve tried to quit before without success, this time avoid the triggers that may cause the relapse.

## 2019-07-29 NOTE — PROGRESS NOTES
Yuliya Nunes is a 21year old female here for Patient presents with:  Medication Follow-Up: Symbalta follow up  Paperwork: patient wants a letter to cerify that she needs her dog to become an emotional support animal       HPI:       1.  Polyarthritis Denies  --: Denies  --MSK: Denies  --NEURO: Denies  --PSYCH: Denies  --HEME/LYMPH/IMMUN: Denies  --ENDO: Denies  --SKIN: Denies  All other systems reviewed are negative    PHYSICAL EXAM:   /70 (BP Location: Left arm, Patient Position: Sitting, Cuff

## 2019-09-17 NOTE — PROGRESS NOTES
Brittni Morales is a 21year old female here for Patient presents with:  Complete Form: Patient is requesting Service dog form to be filled out  Consult: Patient is requesting counseling for Person ressons      HPI:       1.  Moderate episode of recurren Denies  --: Denies  --MSK: Denies  --NEURO: Denies  --PSYCH: Denies  --HEME/LYMPH/IMMUN: Denies  --ENDO: Denies  --SKIN: Denies  All other systems reviewed are negative    PHYSICAL EXAM:   /60 (BP Location: Left arm, Patient Position: Sitting, Cuff

## 2019-09-17 NOTE — PATIENT INSTRUCTIONS
hydrocoritsone 2x/day as needed for itching  Lotion with vit E for scarring     will call you for counseling options    Call dental Libra Sethi or other similar dentists to followup on tooth pain    Followup in 3 months, sooner if needed

## 2020-07-21 ENCOUNTER — OFFICE VISIT (OUTPATIENT)
Dept: FAMILY MEDICINE CLINIC | Facility: CLINIC | Age: 25
End: 2020-07-21
Payer: MEDICAID

## 2020-07-21 VITALS
WEIGHT: 263 LBS | DIASTOLIC BLOOD PRESSURE: 60 MMHG | BODY MASS INDEX: 43.29 KG/M2 | OXYGEN SATURATION: 98 % | TEMPERATURE: 98 F | HEIGHT: 65.55 IN | SYSTOLIC BLOOD PRESSURE: 110 MMHG | HEART RATE: 82 BPM

## 2020-07-21 DIAGNOSIS — F33.1 MODERATE EPISODE OF RECURRENT MAJOR DEPRESSIVE DISORDER (HCC): ICD-10-CM

## 2020-07-21 DIAGNOSIS — F41.9 ANXIETY: ICD-10-CM

## 2020-07-21 DIAGNOSIS — Z01.419 WELL WOMAN EXAM: ICD-10-CM

## 2020-07-21 DIAGNOSIS — Z30.09 ENCOUNTER FOR COUNSELING REGARDING CONTRACEPTION: ICD-10-CM

## 2020-07-21 DIAGNOSIS — G89.29 CHRONIC BILATERAL LOW BACK PAIN WITHOUT SCIATICA: ICD-10-CM

## 2020-07-21 DIAGNOSIS — M54.50 CHRONIC BILATERAL LOW BACK PAIN WITHOUT SCIATICA: ICD-10-CM

## 2020-07-21 DIAGNOSIS — N64.3 GALACTORRHEA: ICD-10-CM

## 2020-07-21 DIAGNOSIS — Z00.00 ROUTINE GENERAL MEDICAL EXAMINATION AT A HEALTH CARE FACILITY: Primary | ICD-10-CM

## 2020-07-21 DIAGNOSIS — F17.200 TOBACCO DEPENDENCE: ICD-10-CM

## 2020-07-21 PROCEDURE — 3074F SYST BP LT 130 MM HG: CPT | Performed by: FAMILY MEDICINE

## 2020-07-21 PROCEDURE — 3078F DIAST BP <80 MM HG: CPT | Performed by: FAMILY MEDICINE

## 2020-07-21 PROCEDURE — 3008F BODY MASS INDEX DOCD: CPT | Performed by: FAMILY MEDICINE

## 2020-07-21 PROCEDURE — 99395 PREV VISIT EST AGE 18-39: CPT | Performed by: FAMILY MEDICINE

## 2020-07-21 PROCEDURE — 99214 OFFICE O/P EST MOD 30 MIN: CPT | Performed by: FAMILY MEDICINE

## 2020-07-21 RX ORDER — ETONOGESTREL AND ETHINYL ESTRADIOL 11.7; 2.7 MG/1; MG/1
INSERT, EXTENDED RELEASE VAGINAL
Qty: 3 EACH | Refills: 3 | Status: SHIPPED | OUTPATIENT
Start: 2020-07-21 | End: 2022-01-06

## 2020-07-21 RX ORDER — ONDANSETRON 4 MG/1
4 TABLET, ORALLY DISINTEGRATING ORAL EVERY 8 HOURS PRN
COMMUNITY
Start: 2020-03-09 | End: 2021-04-30

## 2020-07-21 NOTE — PROGRESS NOTES
Neto Junior is a 31year old female who is here for Patient presents with:  Wellness Visit: Wellness visit wirh no pap smear, patient needs birth control refill      HPI:     1. Routine general medical examination at a health care facility  2.  Maco Metzger Types: Cannabis        Comment: Last used 01/2020      Sexual activity: Yes        Partners: Male        Birth control/protection: Inserts    Other Topics      Concerns:        Caffeine Concern: Yes          1-2 cans of pop and 0-1 cups of coffee daily Spinal stenosis       History reviewed. No pertinent surgical history.    Social History:    Social History    Tobacco Use      Smoking status: Current Every Day Smoker        Packs/day: 0.25        Years: 0.50        Pack years: .125        Types: Cigarett WITH DIFFERENTIAL WITH PLATELET; Future  - COMP METABOLIC PANEL (14); Future  - TSH W REFLEX TO FREE T4; Future  - LIPID PANEL; Future    2. Well woman exam  - OBG - INTERNAL    3. Encounter for counseling regarding contraception  -refilled nuvaring    4.

## 2020-07-21 NOTE — PATIENT INSTRUCTIONS
-- schedule appt for fasting bloodwork anytime that you are able to  -- go to [email or web address] or call [phone] to schedule an appointment online with Jorge  -- we will call with results about 5-7 days after bloodwork is completed    -- work on [employer] of quitting such as reducing heart risks and saving money. Keep this list and review it whenever you feel like smoking. · Get support. Let your friends know you may call them to chat when you have an urge to smoke.   · If you’ve tried to quit before withou

## 2020-10-06 ENCOUNTER — TELEPHONE (OUTPATIENT)
Dept: FAMILY MEDICINE CLINIC | Facility: CLINIC | Age: 25
End: 2020-10-06

## 2020-10-06 NOTE — TELEPHONE ENCOUNTER
Patient called and would like to know if Dr. John Samaniego would write up a note exempting the patient from the flu shot.  Patient states she gets really sick from the flu shot and she can not afford not to work right now since her fiance just had surgery and he is

## 2020-10-06 NOTE — TELEPHONE ENCOUNTER
Unfortunately, we can't provide an exemption without documented allergy or reaction. She could reach out to whoever gave it to her if she did react to it. If it wasn't a true allergy, then chances are she will likely be fine.   She could get it on a Fri

## 2020-11-03 ENCOUNTER — TELEPHONE (OUTPATIENT)
Dept: FAMILY MEDICINE CLINIC | Facility: CLINIC | Age: 25
End: 2020-11-03

## 2020-11-03 NOTE — TELEPHONE ENCOUNTER
Pt states she had to leave work due to a heavy menstrual and blood clots and back pain. Patient wanted an appt with Dr Zohaib De Jesus today. No available appt.

## 2020-11-03 NOTE — TELEPHONE ENCOUNTER
Spoke to patient. States she left work for heavy bleeding. Had cramping along with it. Passed a couple of clots. Advised if continues to proceed to ER for evaluation. Advised Ibuprofen for cramping.   Stated has a gynecologist but has not been there in

## 2021-04-12 ENCOUNTER — TELEPHONE (OUTPATIENT)
Dept: FAMILY MEDICINE CLINIC | Facility: CLINIC | Age: 26
End: 2021-04-12

## 2021-04-12 NOTE — TELEPHONE ENCOUNTER
Pt called states she has missed her menstrual and has taken 3 pregnancy test and all come back negative so she is worried something else is going on and would like to be seen soon.  Pt states she has tried to get in to see OB/GYN but appts are booked out fa

## 2021-04-12 NOTE — TELEPHONE ENCOUNTER
States last menses was 3/4/21. No other symptoms. Advised to schedule with GYN at next available. VU and in agreement.

## 2021-04-30 ENCOUNTER — OFFICE VISIT (OUTPATIENT)
Dept: OBGYN CLINIC | Facility: CLINIC | Age: 26
End: 2021-04-30
Payer: MEDICAID

## 2021-04-30 VITALS — WEIGHT: 277 LBS | SYSTOLIC BLOOD PRESSURE: 104 MMHG | BODY MASS INDEX: 45 KG/M2 | DIASTOLIC BLOOD PRESSURE: 62 MMHG

## 2021-04-30 DIAGNOSIS — Z12.4 CERVICAL CANCER SCREENING: ICD-10-CM

## 2021-04-30 DIAGNOSIS — N92.6 IRREGULAR MENSES: ICD-10-CM

## 2021-04-30 DIAGNOSIS — N91.2 ABSENCE OF MENSTRUATION: Primary | ICD-10-CM

## 2021-04-30 PROCEDURE — 88175 CYTOPATH C/V AUTO FLUID REDO: CPT | Performed by: NURSE PRACTITIONER

## 2021-04-30 PROCEDURE — 3078F DIAST BP <80 MM HG: CPT | Performed by: NURSE PRACTITIONER

## 2021-04-30 PROCEDURE — 99203 OFFICE O/P NEW LOW 30 MIN: CPT | Performed by: NURSE PRACTITIONER

## 2021-04-30 PROCEDURE — 3074F SYST BP LT 130 MM HG: CPT | Performed by: NURSE PRACTITIONER

## 2021-04-30 RX ORDER — MEDROXYPROGESTERONE ACETATE 10 MG/1
10 TABLET ORAL DAILY
Qty: 10 TABLET | Refills: 0 | Status: SHIPPED | OUTPATIENT
Start: 2021-04-30 | End: 2021-11-09 | Stop reason: ALTCHOICE

## 2021-04-30 NOTE — PROGRESS NOTES
Here for new gynecology visit. 31year old G 2 P 2. Patient's last menstrual period was 03/04/2021 (exact date). Hereto discuss irregular menses since last used her Nuvaring. She took her last one out and then her menses never came.  She is curious abo discharge. CERVIX:  No lesions or CMT. Pap smear done with HPV. UTERUS:  anteflexed and normal size. ADNEXA:  No pain or masses. IMP/PLAN:   1.  Absence of menstruation  Once she has gone 2 week with abstinence take another urine HCG and as long as n

## 2021-05-11 ENCOUNTER — HOSPITAL ENCOUNTER (OUTPATIENT)
Dept: ULTRASOUND IMAGING | Age: 26
Discharge: HOME | End: 2021-05-11
Attending: NURSE PRACTITIONER
Payer: MEDICAID

## 2021-05-11 DIAGNOSIS — N92.6 IRREGULAR MENSES: ICD-10-CM

## 2021-05-11 PROCEDURE — 76830 TRANSVAGINAL US NON-OB: CPT | Performed by: NURSE PRACTITIONER

## 2021-05-11 PROCEDURE — 76856 US EXAM PELVIC COMPLETE: CPT | Performed by: NURSE PRACTITIONER

## 2021-06-02 ENCOUNTER — TELEPHONE (OUTPATIENT)
Dept: FAMILY MEDICINE CLINIC | Facility: CLINIC | Age: 26
End: 2021-06-02

## 2021-06-02 ENCOUNTER — TELEPHONE (OUTPATIENT)
Dept: OBGYN CLINIC | Facility: CLINIC | Age: 26
End: 2021-06-02

## 2021-06-02 NOTE — TELEPHONE ENCOUNTER
Patient states she completed labs [facility] Riley Olson (July 2020[facility]. She will have them re-faxed to us for him to review and give recommendations. Michaela aware we are waiting on labs.

## 2021-06-02 NOTE — TELEPHONE ENCOUNTER
Per Dr. Zohaib De Jesus labs are stable. Patient to make follow up appointment for July for annual wellness. Relayed above to patient.    She agreed with plan and will call back for appointment

## 2021-06-02 NOTE — TELEPHONE ENCOUNTER
Pt wants to know if we received lab results from [address] [place] [address], states she had labs done their and they would be faxing over results to us.

## 2021-08-16 ENCOUNTER — MED REC SCAN ONLY (OUTPATIENT)
Dept: OBGYN CLINIC | Facility: CLINIC | Age: 26
End: 2021-08-16

## 2021-11-09 ENCOUNTER — OFFICE VISIT (OUTPATIENT)
Dept: FAMILY MEDICINE CLINIC | Facility: CLINIC | Age: 26
End: 2021-11-09
Payer: MEDICAID

## 2021-11-09 VITALS
WEIGHT: 266 LBS | BODY MASS INDEX: 43.79 KG/M2 | DIASTOLIC BLOOD PRESSURE: 60 MMHG | TEMPERATURE: 98 F | HEART RATE: 92 BPM | HEIGHT: 65.16 IN | OXYGEN SATURATION: 98 % | SYSTOLIC BLOOD PRESSURE: 100 MMHG

## 2021-11-09 DIAGNOSIS — Z00.00 ROUTINE GENERAL MEDICAL EXAMINATION AT A HEALTH CARE FACILITY: Primary | ICD-10-CM

## 2021-11-09 DIAGNOSIS — F17.200 TOBACCO DEPENDENCE: ICD-10-CM

## 2021-11-09 DIAGNOSIS — F33.1 MODERATE EPISODE OF RECURRENT MAJOR DEPRESSIVE DISORDER (HCC): ICD-10-CM

## 2021-11-09 DIAGNOSIS — L65.9 HAIR LOSS: ICD-10-CM

## 2021-11-09 DIAGNOSIS — F41.9 ANXIETY: ICD-10-CM

## 2021-11-09 PROCEDURE — 99214 OFFICE O/P EST MOD 30 MIN: CPT | Performed by: FAMILY MEDICINE

## 2021-11-09 PROCEDURE — 3008F BODY MASS INDEX DOCD: CPT | Performed by: FAMILY MEDICINE

## 2021-11-09 PROCEDURE — 3078F DIAST BP <80 MM HG: CPT | Performed by: FAMILY MEDICINE

## 2021-11-09 PROCEDURE — 99395 PREV VISIT EST AGE 18-39: CPT | Performed by: FAMILY MEDICINE

## 2021-11-09 PROCEDURE — 3074F SYST BP LT 130 MM HG: CPT | Performed by: FAMILY MEDICINE

## 2021-11-09 RX ORDER — CLINDAMYCIN HYDROCHLORIDE 300 MG/1
300 CAPSULE ORAL 4 TIMES DAILY
COMMUNITY
Start: 2021-10-15

## 2021-11-09 NOTE — PROGRESS NOTES
Wright Cabot is a 31year old female who is here for Patient presents with:  Wellness Visit      HPI:     1. Routine general medical examination at a health care facility  2.  Well woman exam  -due for wellness  -complains of right sided galactorrhe kg)  04/30/21 : 277 lb (125.6 kg)  07/21/20 : 263 lb (119.3 kg)  09/17/19 : 259 lb 6 oz (117.7 kg)  07/29/19 : 263 lb 6 oz (119.5 kg)  06/11/19 : 268 lb 4 oz (121.7 kg)      Patient Active Problem List:     Chronic bilateral low back pain without sciatica comment: 5 cigarettes daily    Vaping Use      Vaping Use: Never used    Alcohol use: Not Currently      Comment: occ    Drug use: Not Currently      Types: Cannabis      Comment: Last used 01/2020          EXAM:   /60   Pulse 92   Temp 97.5 °F (36.4 Hair loss  -reviewed possible causes  -check TSH        Orders This Visit:  Orders Placed This Encounter      CBC With Differential With Platelet      Comp Metabolic Panel (14)      TSH W Reflex To Free T4      Lipid Panel      Meds This Visit:  Requested

## 2021-11-09 NOTE — PATIENT INSTRUCTIONS
Go to [facility] for fasting bloodwork    We will call with results    Followup in 1 yr, sooner if needed      --------------------------------------------------------------------    If labs ordered:  -- schedule appt for fasting bloodwork anytime that in bed - these can activate the brain over time and associate being awake with being in the bed  -if you are not sleeping, leave the bedroom, do any of the above until you are tired, then try again  -this will help reinforce with the brain the the bed is f the situations that make you want a cigarette. Then think of other ways to deal with these situations.  Here are some examples:  Situation How I'll handle it   Finishing a meal Get up from the table and take a walk   Having an argument Find a quiet place an

## 2022-01-06 RX ORDER — ETONOGESTREL AND ETHINYL ESTRADIOL 11.7; 2.7 MG/1; MG/1
INSERT, EXTENDED RELEASE VAGINAL
Qty: 3 EACH | Refills: 3 | Status: SHIPPED | OUTPATIENT
Start: 2022-01-06

## 2022-01-06 NOTE — TELEPHONE ENCOUNTER
The patient called to the office to request a refill on her Nuva Ring to be went to the [facility] [id number] [address] in [place]. She stated that she had a wellness exam with Dr Jayne Arthur on 11/09/2021.

## 2022-01-06 NOTE — TELEPHONE ENCOUNTER
Pt requesting refill of Etonogestrel-Ethinyl Estradiol 0.12-0.015 MG/24HR Vaginal Ring  Gynecology Medication Protocol Passed   Last Time Medication was Prescribed :  07/21/2020 dispense #3 and 3 refills    Last Office Visit with Provider: 11/09/2021   Rec

## 2022-12-01 ENCOUNTER — OFFICE VISIT (OUTPATIENT)
Dept: FAMILY MEDICINE CLINIC | Facility: CLINIC | Age: 27
End: 2022-12-01
Payer: MEDICAID

## 2022-12-01 VITALS
BODY MASS INDEX: 42.63 KG/M2 | HEART RATE: 100 BPM | WEIGHT: 259 LBS | HEIGHT: 65.16 IN | SYSTOLIC BLOOD PRESSURE: 100 MMHG | DIASTOLIC BLOOD PRESSURE: 60 MMHG | TEMPERATURE: 98 F | OXYGEN SATURATION: 97 %

## 2022-12-01 DIAGNOSIS — Z87.2 HISTORY OF INGROWN HAIR: ICD-10-CM

## 2022-12-01 DIAGNOSIS — M54.50 CHRONIC BILATERAL LOW BACK PAIN WITHOUT SCIATICA: ICD-10-CM

## 2022-12-01 DIAGNOSIS — G89.29 CHRONIC BILATERAL LOW BACK PAIN WITHOUT SCIATICA: ICD-10-CM

## 2022-12-01 DIAGNOSIS — Z13.228 SCREENING FOR ENDOCRINE, NUTRITIONAL, METABOLIC AND IMMUNITY DISORDER: ICD-10-CM

## 2022-12-01 DIAGNOSIS — Z86.39 HISTORY OF IRON DEFICIENCY: ICD-10-CM

## 2022-12-01 DIAGNOSIS — Z13.6 SCREENING FOR CARDIOVASCULAR CONDITION: ICD-10-CM

## 2022-12-01 DIAGNOSIS — Z13.29 SCREENING FOR ENDOCRINE, NUTRITIONAL, METABOLIC AND IMMUNITY DISORDER: ICD-10-CM

## 2022-12-01 DIAGNOSIS — Z00.00 ROUTINE GENERAL MEDICAL EXAMINATION AT A HEALTH CARE FACILITY: Primary | ICD-10-CM

## 2022-12-01 DIAGNOSIS — Z83.79 FAMILY HISTORY OF CELIAC DISEASE: ICD-10-CM

## 2022-12-01 DIAGNOSIS — Z13.21 SCREENING FOR ENDOCRINE, NUTRITIONAL, METABOLIC AND IMMUNITY DISORDER: ICD-10-CM

## 2022-12-01 DIAGNOSIS — Z13.0 SCREENING FOR ENDOCRINE, NUTRITIONAL, METABOLIC AND IMMUNITY DISORDER: ICD-10-CM

## 2022-12-01 PROCEDURE — 3008F BODY MASS INDEX DOCD: CPT | Performed by: FAMILY MEDICINE

## 2022-12-01 PROCEDURE — 99214 OFFICE O/P EST MOD 30 MIN: CPT | Performed by: FAMILY MEDICINE

## 2022-12-01 PROCEDURE — 99395 PREV VISIT EST AGE 18-39: CPT | Performed by: FAMILY MEDICINE

## 2022-12-01 PROCEDURE — 3074F SYST BP LT 130 MM HG: CPT | Performed by: FAMILY MEDICINE

## 2022-12-01 PROCEDURE — 3078F DIAST BP <80 MM HG: CPT | Performed by: FAMILY MEDICINE

## 2022-12-01 RX ORDER — ETONOGESTREL AND ETHINYL ESTRADIOL 11.7; 2.7 MG/1; MG/1
INSERT, EXTENDED RELEASE VAGINAL
Qty: 3 EACH | Refills: 3 | Status: SHIPPED | OUTPATIENT
Start: 2022-12-01

## 2022-12-01 RX ORDER — ORPHENADRINE CITRATE 100 MG/1
100 TABLET, EXTENDED RELEASE ORAL 2 TIMES DAILY
COMMUNITY
Start: 2022-11-21

## 2022-12-01 RX ORDER — IBUPROFEN 800 MG/1
800 TABLET ORAL 3 TIMES DAILY
COMMUNITY
Start: 2022-11-21

## 2022-12-30 LAB
ALBUMIN/GLOBULIN RATIO: 1.5
ALBUMIN: 4 G/DL
ALKALINE PHOSPHATASE: 75
ALT: 15
ANION GAP: 16
AST: 14
BILIRUBIN, TOTAL: 0.4 MG/DL
BUN/CREATININE RATIO: 13.3
BUN: 8
CALCIUM: 9.3
CARBON DIOXIDE, TOTAL: 24 MMOL/L
CHLORIDE: 105
CREATININE: 0.6 MG/DL (ref 0.6–1.2)
GLOBULIN: 2.7
GLUCOSE: 93
HEMATOCRIT: 42.5 %
HGB: 13.5 G/DL
MCH: 28.4 PG
MCHC: 31.8 G/DL
MCV: 89.3 FL
PLATELETS: 300 X10E3/UL
POTASSIUM: 4.7
PROTEIN, TOTAL: 6.7
RBC: 4.76 /HPF
SODIUM: 140
WBC: 6.58

## 2023-01-03 ENCOUNTER — TELEPHONE (OUTPATIENT)
Dept: FAMILY MEDICINE CLINIC | Facility: CLINIC | Age: 28
End: 2023-01-03

## 2023-01-03 LAB
CHOL/HDL RATIO: 4
CHOLESTEROL: 211
FERRITIN: 166
HDL CHOL: 53
IRON: 59
LDL CHOLESTEROL CALC: 138 MG/DL
NON-HDL CHOLESTEROL: 158
TIBC: 392
TRIGLYCERIDES: 101 MG/DL
TSH: 1.09 UIU/ML
UIBC: 333 ΜG/DL

## 2023-01-03 NOTE — TELEPHONE ENCOUNTER
Labs done on 12/30/2022 at [facility]. Labs ordered by Dr. Evelyn Gupta. Labs abstracted. Please review and advise.  Thank you

## 2023-01-04 NOTE — TELEPHONE ENCOUNTER
Labs stable except:    cholesterol mildly elevated, increase exercise, reduce fats and sugars, more fruits and veggies    Iron levels and anemia levels are ok    Celiac screen is negative

## 2023-04-28 RX ORDER — ETONOGESTREL AND ETHINYL ESTRADIOL .12; .015 MG/D; MG/D
RING VAGINAL
Qty: 3 EACH | Refills: 3 | Status: SHIPPED | OUTPATIENT
Start: 2023-04-28

## 2023-11-09 ENCOUNTER — OFFICE VISIT (OUTPATIENT)
Dept: FAMILY MEDICINE CLINIC | Facility: CLINIC | Age: 28
End: 2023-11-09
Payer: MEDICAID

## 2023-11-09 VITALS
TEMPERATURE: 98 F | HEART RATE: 101 BPM | OXYGEN SATURATION: 98 % | DIASTOLIC BLOOD PRESSURE: 74 MMHG | WEIGHT: 251.38 LBS | SYSTOLIC BLOOD PRESSURE: 120 MMHG | BODY MASS INDEX: 42 KG/M2

## 2023-11-09 DIAGNOSIS — M25.551 BILATERAL HIP PAIN: ICD-10-CM

## 2023-11-09 DIAGNOSIS — E55.9 VITAMIN D DEFICIENCY: ICD-10-CM

## 2023-11-09 DIAGNOSIS — Z86.39 HISTORY OF IRON DEFICIENCY: ICD-10-CM

## 2023-11-09 DIAGNOSIS — E66.01 CLASS 3 SEVERE OBESITY DUE TO EXCESS CALORIES WITHOUT SERIOUS COMORBIDITY WITH BODY MASS INDEX (BMI) OF 40.0 TO 44.9 IN ADULT (HCC): ICD-10-CM

## 2023-11-09 DIAGNOSIS — Z00.00 ROUTINE GENERAL MEDICAL EXAMINATION AT A HEALTH CARE FACILITY: Primary | ICD-10-CM

## 2023-11-09 DIAGNOSIS — M25.552 BILATERAL HIP PAIN: ICD-10-CM

## 2023-11-09 DIAGNOSIS — L29.9 ITCHY SCALP: ICD-10-CM

## 2023-11-09 PROCEDURE — 3078F DIAST BP <80 MM HG: CPT | Performed by: FAMILY MEDICINE

## 2023-11-09 PROCEDURE — 99214 OFFICE O/P EST MOD 30 MIN: CPT | Performed by: FAMILY MEDICINE

## 2023-11-09 PROCEDURE — 3074F SYST BP LT 130 MM HG: CPT | Performed by: FAMILY MEDICINE

## 2023-11-09 PROCEDURE — 99395 PREV VISIT EST AGE 18-39: CPT | Performed by: FAMILY MEDICINE

## 2023-11-09 RX ORDER — BUPROPION HYDROCHLORIDE 150 MG/1
150 TABLET ORAL DAILY
Qty: 30 TABLET | Refills: 2 | Status: SHIPPED | OUTPATIENT
Start: 2023-11-09

## 2023-11-09 RX ORDER — IBUPROFEN 800 MG/1
800 TABLET ORAL EVERY 8 HOURS PRN
Qty: 30 TABLET | Refills: 1 | Status: SHIPPED | OUTPATIENT
Start: 2023-11-09

## 2023-11-09 RX ORDER — KETOCONAZOLE 20 MG/ML
10 SHAMPOO TOPICAL
Qty: 120 ML | Refills: 1 | Status: SHIPPED | OUTPATIENT
Start: 2023-11-09

## 2023-11-11 ENCOUNTER — NURSE ONLY (OUTPATIENT)
Dept: FAMILY MEDICINE CLINIC | Facility: CLINIC | Age: 28
End: 2023-11-11
Payer: MEDICAID

## 2023-11-11 DIAGNOSIS — Z11.1 SCREENING FOR TUBERCULOSIS: Primary | ICD-10-CM

## 2023-11-11 PROCEDURE — 86580 TB INTRADERMAL TEST: CPT | Performed by: FAMILY MEDICINE

## 2023-11-13 ENCOUNTER — NURSE ONLY (OUTPATIENT)
Dept: FAMILY MEDICINE CLINIC | Facility: CLINIC | Age: 28
End: 2023-11-13
Payer: MEDICAID

## 2024-02-10 ENCOUNTER — OFFICE VISIT (OUTPATIENT)
Dept: FAMILY MEDICINE CLINIC | Facility: CLINIC | Age: 29
End: 2024-02-10
Payer: MEDICAID

## 2024-02-10 VITALS
WEIGHT: 253.63 LBS | DIASTOLIC BLOOD PRESSURE: 70 MMHG | BODY MASS INDEX: 42.26 KG/M2 | OXYGEN SATURATION: 98 % | HEIGHT: 65 IN | SYSTOLIC BLOOD PRESSURE: 122 MMHG | TEMPERATURE: 98 F | HEART RATE: 98 BPM | RESPIRATION RATE: 16 BRPM

## 2024-02-10 DIAGNOSIS — F17.200 TOBACCO DEPENDENCE: ICD-10-CM

## 2024-02-10 DIAGNOSIS — J02.0 STREP THROAT: Primary | ICD-10-CM

## 2024-02-10 DIAGNOSIS — E66.01 CLASS 3 SEVERE OBESITY DUE TO EXCESS CALORIES WITHOUT SERIOUS COMORBIDITY WITH BODY MASS INDEX (BMI) OF 40.0 TO 44.9 IN ADULT (HCC): ICD-10-CM

## 2024-02-10 PROCEDURE — 99215 OFFICE O/P EST HI 40 MIN: CPT | Performed by: FAMILY MEDICINE

## 2024-02-10 RX ORDER — CLINDAMYCIN HYDROCHLORIDE 300 MG/1
300 CAPSULE ORAL
COMMUNITY
Start: 2024-02-08 | End: 2024-02-15

## 2024-02-10 RX ORDER — ONDANSETRON 4 MG/1
4 TABLET, ORALLY DISINTEGRATING ORAL EVERY 8 HOURS PRN
Qty: 30 TABLET | Refills: 1 | Status: SHIPPED | OUTPATIENT
Start: 2024-02-10

## 2024-02-10 RX ORDER — BUPROPION HYDROCHLORIDE 300 MG/1
300 TABLET ORAL DAILY
Qty: 90 TABLET | Refills: 1 | Status: SHIPPED | OUTPATIENT
Start: 2024-02-10

## 2024-02-10 NOTE — PROGRESS NOTES
Laura Tatum is a 29 year old female here for   Chief Complaint   Patient presents with    Body ache and/or chills     Was seen  24     Medication Follow-Up       HPI:       1. Strep throat  -diagnosed last week at   -given clinda  -notes slightly improvement  -notes neck discomfort on left side with a little bit of swelling  -thinks might be improving, but not sure  -also notes nausea with clinda    2. Class 3 severe obesity due to excess calories without serious comorbidity with body mass index (BMI) of 40.0 to 44.9 in adult (HCC)  -no significant improvement with wellbutrin with diet     3. Tobacco dependence  -however wellbutrin helped her quit smoking!        HISTORY:  Past Medical History:   Diagnosis Date    Allergic rhinitis     Anxiety     Depression     DJD (degenerative joint disease)     Obesity     Spinal stenosis       Past Surgical History:   Procedure Laterality Date            Family History   Problem Relation Age of Onset    Cancer Father         Bladder cancer    Diabetes Son         Type 1      Social History:   Social History     Socioeconomic History    Marital status: Single   Tobacco Use    Smoking status: Former     Packs/day: 0.50     Years: 0.50     Additional pack years: 0.00     Total pack years: 0.25     Types: Cigarettes     Quit date: 2019     Years since quittin.4    Smokeless tobacco: Never    Tobacco comments:     2-4 cigarettes daily   Vaping Use    Vaping Use: Never used   Substance and Sexual Activity    Alcohol use: Yes     Alcohol/week: 0.0 - 1.0 standard drinks of alcohol     Comment: occ    Drug use: Not Currently     Frequency: 1.0 times per week     Types: Cannabis     Comment: Last used 2020    Sexual activity: Yes     Partners: Male     Comment: nuvring   Other Topics Concern    Caffeine Concern Yes    Weight Concern Yes    Exercise Yes    Seat Belt Yes        Medications (Active prior to today's visit):  Current Outpatient Medications    Medication Sig Dispense Refill    clindamycin 300 MG Oral Cap Take 1 capsule (300 mg total) by mouth.      buPROPion  MG Oral Tablet 24 Hr Take 1 tablet (300 mg total) by mouth daily. 90 tablet 1    ondansetron 4 MG Oral Tablet Dispersible Take 1 tablet (4 mg total) by mouth every 8 (eight) hours as needed for Nausea. 30 tablet 1    ibuprofen 800 MG Oral Tab Take 1 tablet (800 mg total) by mouth every 8 (eight) hours as needed for Pain. 30 tablet 1    ketoconazole 2 % External Shampoo Apply 10 mL topically twice a week. 120 mL 1    ELURYNG 0.12-0.015 MG/24HR Vaginal Ring USE AS DIRECTED. STARTING ON FIRST DAY OF MENSTRUAL CYCLE 3 each 3    orphenadrine  MG Oral Tablet 12 Hr Take 100 mg by mouth 2 (two) times daily. (Patient not taking: Reported on 11/9/2023)         Allergies:  Allergies   Allergen Reactions    Amoxicillin HIVES    Augmentin [Amoxicillin-Pot Clavulanate] HIVES    Clavulanic Acid HIVES         ROS:   See HPI for relevant ROS    --GEN: No other complaints  --HEENT: No other complaints  --RESP: No other complaints  --CV: No other complaints  --GI: No other complaints  --MSK: No other complaints    All other systems reviewed are negative    PHYSICAL EXAM:   /70   Pulse 98   Temp 97.8 °F (36.6 °C) (Temporal)   Resp 16   Ht 5' 5\" (1.651 m)   Wt 253 lb 9.6 oz (115 kg)   LMP 10/21/2023 (Approximate)   SpO2 98%   BMI 42.20 kg/m²     Gen: NAD  HEENT: NCAT, pupils equal and round  Pulm: CTAB, no wheezing  CV: RRR  Ext: full ROM; mild fullness in left side of neck with minimal tenderness on palpation  Psych: normal affect     ASSESSMENT/PLAN:     1. Strep throat  -c/w clinda  -reviewed expectant management   -no signs of acute abscess to necessitate imaging at this time  -but reviewed signs to watch for  -go to ER if swelling and pain significantly worsening despite clinda  -otherwise, continue with clinda as prescribed and would expect slow improvement  -she will contact me next  week - if no improvement, would consider imaging at that time      2. Class 3 severe obesity due to excess calories without serious comorbidity with body mass index (BMI) of 40.0 to 44.9 in adult (HCC)  -increase wellbutrin to 300mg daily  -if not helping in 1 month, would consider trulicity    3. Tobacco dependence  -quit smoking   -congratulated patient        Chronic Conditions:    No problem-specific Assessment & Plan notes found for this encounter.       Health Maintenance:  Health Maintenance   Topic Date Due    Pap Smear  04/30/2024    Influenza Vaccine (1) 11/09/2024 (Originally 10/1/2023)    Annual Physical  11/09/2024    DTaP,Tdap,and Td Vaccines (7 - Td or Tdap) 10/21/2027    Annual Depression Screening  Completed    Pneumococcal Vaccine: Birth to 64yrs  Aged Out               The patient is asked to return in 3 months.    Orders This Visit:  No orders of the defined types were placed in this encounter.      Meds This Visit:  Requested Prescriptions     Signed Prescriptions Disp Refills    buPROPion  MG Oral Tablet 24 Hr 90 tablet 1     Sig: Take 1 tablet (300 mg total) by mouth daily.    ondansetron 4 MG Oral Tablet Dispersible 30 tablet 1     Sig: Take 1 tablet (4 mg total) by mouth every 8 (eight) hours as needed for Nausea.       Imaging & Referrals:  None     PO STROUD MD    I spent a total of 40 minutes, more than half of which was spent counseling/coordinating care regarding strep, obesity

## 2024-02-10 NOTE — PATIENT INSTRUCTIONS
Continue clindamycin as prescribed    Take with food for each dose    Zofran as needed    If neck swelling not improving or worsening, let me know on Monday    If severely worse, go to ER before then    Increase bupropion to 300mg daily    Update me in 1 month if not affected weight or appetite

## 2024-03-22 ENCOUNTER — PATIENT MESSAGE (OUTPATIENT)
Dept: FAMILY MEDICINE CLINIC | Facility: CLINIC | Age: 29
End: 2024-03-22

## 2024-03-22 NOTE — TELEPHONE ENCOUNTER
From: Laura Tatum  To: PO STROUD  Sent: 3/22/2024 3:46 PM CDT  Subject: Medication.     Good evening,    You told me at my last appointment to let you know if the bupropion was working or not for my weight and I don’t feel it is as My weight is still the same and I still have cravings and the urge to eat or snack.     Thank you.

## 2024-03-25 RX ORDER — DULAGLUTIDE 0.75 MG/.5ML
0.75 INJECTION, SOLUTION SUBCUTANEOUS WEEKLY
Qty: 2 ML | Refills: 1 | Status: SHIPPED | OUTPATIENT
Start: 2024-03-25

## 2024-05-11 ENCOUNTER — OFFICE VISIT (OUTPATIENT)
Dept: FAMILY MEDICINE CLINIC | Facility: CLINIC | Age: 29
End: 2024-05-11
Payer: MEDICAID

## 2024-05-11 VITALS
SYSTOLIC BLOOD PRESSURE: 126 MMHG | RESPIRATION RATE: 16 BRPM | HEIGHT: 65 IN | HEART RATE: 92 BPM | DIASTOLIC BLOOD PRESSURE: 72 MMHG | BODY MASS INDEX: 40.88 KG/M2 | WEIGHT: 245.38 LBS | TEMPERATURE: 97 F | OXYGEN SATURATION: 98 %

## 2024-05-11 DIAGNOSIS — E66.01 CLASS 3 SEVERE OBESITY DUE TO EXCESS CALORIES WITHOUT SERIOUS COMORBIDITY WITH BODY MASS INDEX (BMI) OF 40.0 TO 44.9 IN ADULT (HCC): Primary | ICD-10-CM

## 2024-05-11 DIAGNOSIS — Z01.419 WELL WOMAN EXAM: ICD-10-CM

## 2024-05-11 PROCEDURE — 99214 OFFICE O/P EST MOD 30 MIN: CPT | Performed by: FAMILY MEDICINE

## 2024-05-11 RX ORDER — DULAGLUTIDE 1.5 MG/.5ML
1.5 INJECTION, SOLUTION SUBCUTANEOUS WEEKLY
Qty: 2 ML | Refills: 3 | Status: SHIPPED | OUTPATIENT
Start: 2024-05-11

## 2024-05-11 RX ORDER — ETONOGESTREL AND ETHINYL ESTRADIOL VAGINAL RING .015; .12 MG/D; MG/D
RING VAGINAL
Qty: 3 EACH | Refills: 3 | Status: SHIPPED | OUTPATIENT
Start: 2024-05-11

## 2024-05-11 NOTE — PROGRESS NOTES
Laura Tatum is a 29 year old female here for   Chief Complaint   Patient presents with    Follow - Up       HPI:       1. Class 3 severe obesity due to excess calories without serious comorbidity with body mass index (BMI) of 40.0 to 44.9 in adult (HCC)  -tolerating trulicity 0.75mg  -was limiting appetite initially  -has since wore off  -interested in higher dose    2. Well woman exam  -due for PAP        HISTORY:  Past Medical History:    Allergic rhinitis    Anxiety    Depression    DJD (degenerative joint disease)    Obesity    Spinal stenosis      Past Surgical History:   Procedure Laterality Date            Family History   Problem Relation Age of Onset    Cancer Father         Bladder cancer    Diabetes Son         Type 1      Social History:   Social History     Socioeconomic History    Marital status: Single   Tobacco Use    Smoking status: Former     Current packs/day: 0.00     Average packs/day: 0.5 packs/day for 0.5 years (0.3 ttl pk-yrs)     Types: Cigarettes     Start date: 3/10/2019     Quit date: 2019     Years since quittin.6    Smokeless tobacco: Never    Tobacco comments:     2-4 cigarettes daily   Vaping Use    Vaping status: Never Used   Substance and Sexual Activity    Alcohol use: Yes     Alcohol/week: 0.0 - 1.0 standard drinks of alcohol     Comment: occ    Drug use: Not Currently     Frequency: 1.0 times per week     Types: Cannabis     Comment: Last used 2020    Sexual activity: Yes     Partners: Male     Comment: nuvring   Other Topics Concern    Caffeine Concern No    Stress Concern No    Weight Concern Yes    Special Diet No    Exercise Yes    Seat Belt Yes        Medications (Active prior to today's visit):  Current Outpatient Medications   Medication Sig Dispense Refill    Dulaglutide (TRULICITY) 1.5 MG/0.5ML Subcutaneous Solution Pen-injector Inject 1.5 mg into the skin once a week. 2 mL 3    Etonogestrel-Ethinyl Estradiol (ELURYNG) 0.12-0.015 MG/24HR Vaginal  Ring USE AS DIRECTED. STARTING ON FIRST DAY OF MENSTRUAL CYCLE 3 each 3    buPROPion  MG Oral Tablet 24 Hr Take 1 tablet (300 mg total) by mouth daily. 90 tablet 1    ondansetron 4 MG Oral Tablet Dispersible Take 1 tablet (4 mg total) by mouth every 8 (eight) hours as needed for Nausea. 30 tablet 1    ibuprofen 800 MG Oral Tab Take 1 tablet (800 mg total) by mouth every 8 (eight) hours as needed for Pain. 30 tablet 1    ketoconazole 2 % External Shampoo Apply 10 mL topically twice a week. 120 mL 1    orphenadrine  MG Oral Tablet 12 Hr Take 100 mg by mouth 2 (two) times daily. (Patient not taking: Reported on 11/9/2023)         Allergies:  Allergies   Allergen Reactions    Amoxicillin HIVES    Augmentin [Amoxicillin-Pot Clavulanate] HIVES    Clavulanic Acid HIVES         ROS:   See HPI for relevant ROS    --GEN: No other complaints  --HEENT: No other complaints  --RESP: No other complaints  --CV: No other complaints  --GI: No other complaints  --MSK: No other complaints    All other systems reviewed are negative    PHYSICAL EXAM:   /72 (BP Location: Left arm, Patient Position: Sitting, Cuff Size: adult)   Pulse 92   Temp 97.2 °F (36.2 °C) (Temporal)   Resp 16   Ht 5' 5\" (1.651 m)   Wt 245 lb 6.4 oz (111.3 kg)   LMP 05/11/2024 (Approximate)   SpO2 98%   BMI 40.84 kg/m²     Gen: NAD  HEENT: NCAT, pupils equal and round  Pulm: CTAB, no wheezing  CV: RRR  Ext: full ROM  Psych: normal affect     ASSESSMENT/PLAN:     1. Class 3 severe obesity due to excess calories without serious comorbidity with body mass index (BMI) of 40.0 to 44.9 in adult ([facility])  -c/w lifestyle changes  -increase trulicity to 1.5mg weekly  -update me with how you're doing  -f/u in 3 months    2. Well woman exam  - OBG Referral - Jonny ([place])        Chronic Conditions:    No problem-specific Assessment & Plan notes found for this encounter.       Health Maintenance:  Health Maintenance   Topic Date Due    Pap Smear   04/30/2024    Influenza Vaccine (Season Ended) 11/09/2024 (Originally 10/1/2024)    Annual Physical  11/09/2024    DTaP,Tdap,and Td Vaccines (7 - Td or Tdap) 10/21/2027    Annual Depression Screening  Completed    Pneumococcal Vaccine: Birth to 64yrs  Aged Out               The patient is asked to return in 3 months.    Orders This Visit:  No orders of the defined types were placed in this encounter.      Meds This Visit:  Requested Prescriptions     Signed Prescriptions Disp Refills    Dulaglutide (TRULICITY) 1.5 MG/0.5ML Subcutaneous Solution Pen-injector 2 mL 3     Sig: Inject 1.5 mg into the skin once a week.    Etonogestrel-Ethinyl Estradiol (ELURYNG) 0.12-0.015 MG/24HR Vaginal Ring 3 each 3     Sig: USE AS DIRECTED. STARTING ON FIRST DAY OF MENSTRUAL CYCLE       Imaging & Referrals:  OBG - INTERNAL     PO STROUD MD    I spent a total of 30 minutes, more than half of which was spent counseling/coordinating care regarding obesity, well woman

## 2024-05-11 NOTE — PATIENT INSTRUCTIONS
Increase trulicity to 1.5mg weekly    Update me after your 3rd dose (before you refill) if you want to go up again    Keep bupropion the same dose    Continue to work on lifestyle changes     Followup in 3 months

## 2024-05-28 RX ORDER — ETONOGESTREL AND ETHINYL ESTRADIOL .015; .12 MG/D; MG/D
INSERT, EXTENDED RELEASE VAGINAL
Qty: 3 EACH | Refills: 0 | Status: SHIPPED | OUTPATIENT
Start: 2024-05-28

## 2024-05-28 NOTE — TELEPHONE ENCOUNTER
Requested Prescriptions     Signed Prescriptions Disp Refills    Etonogestrel-Ethinyl Estradiol (HALOETTE) 0.12-0.015 MG/24HR Vaginal Ring 3 each 0     Sig: USE AS DIRECTED. STARTING ON FIRST DAY OF MENSTRUAL CYCLE     Authorizing Provider: PO STROUD     Ordering User: RASHI CISNEROS     Refilled per protocol/OV notes

## 2024-06-05 ENCOUNTER — PATIENT MESSAGE (OUTPATIENT)
Dept: FAMILY MEDICINE CLINIC | Facility: CLINIC | Age: 29
End: 2024-06-05

## 2024-06-06 NOTE — TELEPHONE ENCOUNTER
From: Laura Tatum  To: PO STROUD  Sent: 6/5/2024 6:13 PM CDT  Subject: Oral surgeon     Good evening,    I was wondering if you have referrals for an oral surgeon that would do the work I need in a hospital setting.     Thank you,   Laura tatum

## 2024-06-23 ENCOUNTER — PATIENT MESSAGE (OUTPATIENT)
Dept: FAMILY MEDICINE CLINIC | Facility: CLINIC | Age: 29
End: 2024-06-23

## 2024-06-24 NOTE — TELEPHONE ENCOUNTER
Patient currently using Trulicity 1.5mg. Asking to increase dose. Please advise, thanks.  Next OV 8/10/24

## 2024-06-24 NOTE — TELEPHONE ENCOUNTER
From: Laura Tatum  To: PO STROUD  Sent: 6/23/2024 8:48 PM CDT  Subject: Trulicity    Is it possible to go up on the dosage again?

## 2024-06-25 ENCOUNTER — PATIENT MESSAGE (OUTPATIENT)
Dept: FAMILY MEDICINE CLINIC | Facility: CLINIC | Age: 29
End: 2024-06-25

## 2024-06-25 ENCOUNTER — TELEPHONE (OUTPATIENT)
Dept: FAMILY MEDICINE CLINIC | Facility: CLINIC | Age: 29
End: 2024-06-25
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
## 2024-06-25 DIAGNOSIS — S99.911A INJURY OF RIGHT ANKLE, INITIAL ENCOUNTER: Primary | ICD-10-CM

## 2024-06-25 RX ORDER — DULAGLUTIDE 3 MG/.5ML
3 INJECTION, SOLUTION SUBCUTANEOUS WEEKLY
Qty: 2 ML | Refills: 2 | Status: SHIPPED | OUTPATIENT
Start: 2024-06-25

## 2024-06-25 NOTE — TELEPHONE ENCOUNTER
Patient called requesting a referral to see Dr. Naveen Sampson in regards to new condition, Right ankle injury.   Patient fell down the stairs and was seen at [facility] last night, she is needing referral    LOV: 5/11/2024    Patient denied appointment. Please advise.     Informed patient to allow up to 24 to 48 Business hours for a call back from a nurse.

## 2024-07-23 ENCOUNTER — PATIENT MESSAGE (OUTPATIENT)
Dept: FAMILY MEDICINE CLINIC | Facility: CLINIC | Age: 29
End: 2024-07-23

## 2024-07-23 NOTE — TELEPHONE ENCOUNTER
From: Laura Tatum  To: PO STROUD  Sent: 7/23/2024 12:47 PM CDT  Subject: Weight loss shot    Good afternoon,   I was wondering if there is another shot we could try. As I have not been able to fill the trulicity due to insurance.

## 2024-08-17 ENCOUNTER — OFFICE VISIT (OUTPATIENT)
Dept: FAMILY MEDICINE CLINIC | Facility: CLINIC | Age: 29
End: 2024-08-17
Payer: MEDICAID

## 2024-08-17 VITALS
SYSTOLIC BLOOD PRESSURE: 120 MMHG | RESPIRATION RATE: 16 BRPM | TEMPERATURE: 98 F | BODY MASS INDEX: 40.82 KG/M2 | DIASTOLIC BLOOD PRESSURE: 76 MMHG | OXYGEN SATURATION: 96 % | HEIGHT: 65 IN | WEIGHT: 245 LBS | HEART RATE: 114 BPM

## 2024-08-17 DIAGNOSIS — M25.571 ACUTE RIGHT ANKLE PAIN: Primary | ICD-10-CM

## 2024-08-17 DIAGNOSIS — S99.911A INJURY OF RIGHT ANKLE, INITIAL ENCOUNTER: ICD-10-CM
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
## 2024-08-17 DIAGNOSIS — E66.01 CLASS 3 SEVERE OBESITY DUE TO EXCESS CALORIES WITHOUT SERIOUS COMORBIDITY WITH BODY MASS INDEX (BMI) OF 40.0 TO 44.9 IN ADULT (HCC): ICD-10-CM

## 2024-08-17 PROCEDURE — 99215 OFFICE O/P EST HI 40 MIN: CPT | Performed by: FAMILY MEDICINE

## 2024-08-17 RX ORDER — DULAGLUTIDE 1.5 MG/.5ML
1.5 INJECTION, SOLUTION SUBCUTANEOUS WEEKLY
Qty: 2 ML | Refills: 3 | Status: SHIPPED | OUTPATIENT
Start: 2024-08-17

## 2024-08-17 NOTE — PATIENT INSTRUCTIONS
We will send in trulicity 1.5 weekly to see if insurance will cover that again    If not, let me know via [employer]    Start ankle wrap daily - take off at night    Goal is to use this consistently for 4-6 wks    Limit extended time on your feet when able to    Followup in 3 months, touch base sooner if needed

## 2024-08-17 NOTE — PROGRESS NOTES
Laura Tatum is a 29 year old female here for   Chief Complaint   Patient presents with    Ankle Injury     Right ankle pain after fall 24    Weight Loss       HPI:       1. Acute right ankle pain  2. Injury of right ankle, initial encounter  3. Fall down stairs, initial encounter  -injured ankle 6 wks ago  -seen by ortho - no fracture  -was prescribed aircast, but hasn't been using consistently   -pain not improving    4. Class 3 severe obesity due to excess calories without serious comorbidity with body mass index (BMI) of 40.0 to 44.9 in adult (HCC)  -trulicity 3mg was never covered  -interested in trying 1.5mg again  -if still not covered, interested in oral medication options        HISTORY:  Past Medical History:    Allergic rhinitis    Anxiety    Depression    DJD (degenerative joint disease)    Obesity    Spinal stenosis      Past Surgical History:   Procedure Laterality Date            Family History   Problem Relation Age of Onset    Cancer Father         Bladder cancer    Diabetes Son         Type 1      Social History:   Social History     Socioeconomic History    Marital status: Single   Tobacco Use    Smoking status: Former     Current packs/day: 0.00     Average packs/day: 0.5 packs/day for 0.5 years (0.3 ttl pk-yrs)     Types: Cigarettes     Start date: 3/10/2019     Quit date: 2019     Years since quittin.9    Smokeless tobacco: Never    Tobacco comments:     2-4 cigarettes daily   Vaping Use    Vaping status: Never Used   Substance and Sexual Activity    Alcohol use: Yes     Alcohol/week: 0.0 - 1.0 standard drinks of alcohol     Comment: occ    Drug use: Not Currently     Frequency: 1.0 times per week     Types: Cannabis     Comment: Last used 2020    Sexual activity: Yes     Partners: Male     Comment: nuvring   Other Topics Concern    Caffeine Concern No    Stress Concern No    Weight Concern Yes    Special Diet No    Exercise Yes    Seat Belt Yes        Medications  (Active prior to today's visit):  Current Outpatient Medications   Medication Sig Dispense Refill    Dulaglutide (TRULICITY) 1.5 MG/0.5ML Subcutaneous Solution Pen-injector Inject 1.5 mg into the skin once a week. 2 mL 3    Etonogestrel-Ethinyl Estradiol (HALOETTE) 0.12-0.015 MG/24HR Vaginal Ring USE AS DIRECTED. STARTING ON FIRST DAY OF MENSTRUAL CYCLE 3 each 0    buPROPion  MG Oral Tablet 24 Hr Take 1 tablet (300 mg total) by mouth daily. 90 tablet 1    ibuprofen 800 MG Oral Tab Take 1 tablet (800 mg total) by mouth every 8 (eight) hours as needed for Pain. 30 tablet 1    ketoconazole 2 % External Shampoo Apply 10 mL topically twice a week. 120 mL 1    ondansetron 4 MG Oral Tablet Dispersible Take 1 tablet (4 mg total) by mouth every 8 (eight) hours as needed for Nausea. (Patient not taking: Reported on 8/17/2024) 30 tablet 1       Allergies:  Allergies   Allergen Reactions    Amoxicillin HIVES    Augmentin [Amoxicillin-Pot Clavulanate] HIVES    Clavulanic Acid HIVES         ROS:   See HPI for relevant ROS    --GEN: No other complaints  --HEENT: No other complaints  --RESP: No other complaints  --CV: No other complaints  --GI: No other complaints  --MSK: No other complaints    All other systems reviewed are negative    PHYSICAL EXAM:   /76 (BP Location: Left arm, Patient Position: Sitting, Cuff Size: adult)   Pulse 114   Temp 97.5 °F (36.4 °C) (Temporal)   Resp 16   Ht 5' 5\" (1.651 m)   Wt 245 lb (111.1 kg)   LMP 05/11/2024 (Approximate)   SpO2 96%   BMI 40.77 kg/m²     Gen: NAD  HEENT: NCAT, pupils equal and round  Pulm: CTAB, no wheezing  CV: RRR  Ext: full ROM  Psych: normal affect     ASSESSMENT/PLAN:     1. Acute right ankle pain  2. Injury of right ankle, initial encounter  3. Fall down stairs, initial encounter  -counseled on expectant management  -exercises per handout  -compression wrap or aircast daily when on feet, off at night (may need for 4-6 wks)  -nasids prnn  -limit extensive  time on feet  -f/u in 3 months, sooner prn    4. Class 3 severe obesity due to excess calories without serious comorbidity with body mass index (BMI) of 40.0 to 44.9 in adult (HCC)  -refilled - Dulaglutide (TRULICITY) 1.5 MG/0.5ML Subcutaneous Solution Pen-injector; Inject 1.5 mg into the skin once a week.  Dispense: 2 mL; Refill: 3  -if not covered, would consider metformin +/- phentermine  -counseled on potential side effects  -she will keep me updated  -f/u in 3 months, sooner prn    Chronic Conditions:    No problem-specific Assessment & Plan notes found for this encounter.       Health Maintenance:  Health Maintenance   Topic Date Due    Pap Smear  04/30/2024    Annual Physical  11/09/2024    Influenza Vaccine (1) 10/01/2024    DTaP,Tdap,and Td Vaccines (7 - Td or Tdap) 10/21/2027    Annual Depression Screening  Completed    Pneumococcal Vaccine: Birth to 64yrs  Aged Out               The patient is asked to return in 3 months.    Orders This Visit:  No orders of the defined types were placed in this encounter.      Meds This Visit:  Requested Prescriptions     Signed Prescriptions Disp Refills    Dulaglutide (TRULICITY) 1.5 MG/0.5ML Subcutaneous Solution Pen-injector 2 mL 3     Sig: Inject 1.5 mg into the skin once a week.       Imaging & Referrals:  None     PO STROUD MD    I spent a total of 30 minutes, more than half of which was spent counseling/coordinating care regarding ankle, weight

## 2024-09-02 ENCOUNTER — PATIENT MESSAGE (OUTPATIENT)
Dept: FAMILY MEDICINE CLINIC | Facility: CLINIC | Age: 29
End: 2024-09-02

## 2024-09-03 RX ORDER — BUPROPION HYDROCHLORIDE 300 MG/1
300 TABLET ORAL DAILY
Qty: 90 TABLET | Refills: 1 | Status: SHIPPED | OUTPATIENT
Start: 2024-09-03

## 2024-09-03 NOTE — TELEPHONE ENCOUNTER
Patient still unable to obtain trulicity. She would like to start oral medications, metformin and phentermine. Please reivew

## 2024-09-03 NOTE — TELEPHONE ENCOUNTER
From: Laura Tatum  To: PO STROUD  Sent: 9/2/2024 9:46 AM CDT  Subject: Trulicity     Good morning, I am emailing you per my last visit to let you know that the insurance will still not fill the trulicity. Can we add the other two pills with the bupropion XL as discussed at my last visit? I also sent a request for my bupropion XL for refill.   Thank you

## 2024-09-04 RX ORDER — METFORMIN HCL 500 MG
TABLET, EXTENDED RELEASE 24 HR ORAL
Qty: 180 TABLET | Refills: 1 | Status: SHIPPED | OUTPATIENT
Start: 2024-09-04 | End: 2024-12-18

## 2024-11-07 ENCOUNTER — OFFICE VISIT (OUTPATIENT)
Dept: FAMILY MEDICINE CLINIC | Facility: CLINIC | Age: 29
End: 2024-11-07
Payer: MEDICAID

## 2024-11-07 VITALS
SYSTOLIC BLOOD PRESSURE: 122 MMHG | HEART RATE: 97 BPM | DIASTOLIC BLOOD PRESSURE: 74 MMHG | RESPIRATION RATE: 18 BRPM | HEIGHT: 64 IN | BODY MASS INDEX: 42.14 KG/M2 | TEMPERATURE: 98 F | OXYGEN SATURATION: 97 % | WEIGHT: 246.81 LBS

## 2024-11-07 DIAGNOSIS — E66.01 CLASS 3 SEVERE OBESITY DUE TO EXCESS CALORIES WITHOUT SERIOUS COMORBIDITY WITH BODY MASS INDEX (BMI) OF 40.0 TO 44.9 IN ADULT (HCC): ICD-10-CM

## 2024-11-07 DIAGNOSIS — E55.9 VITAMIN D DEFICIENCY: ICD-10-CM

## 2024-11-07 DIAGNOSIS — E66.813 CLASS 3 SEVERE OBESITY DUE TO EXCESS CALORIES WITHOUT SERIOUS COMORBIDITY WITH BODY MASS INDEX (BMI) OF 40.0 TO 44.9 IN ADULT (HCC): ICD-10-CM

## 2024-11-07 DIAGNOSIS — G44.89 OTHER HEADACHE SYNDROME: ICD-10-CM

## 2024-11-07 DIAGNOSIS — R53.83 LOW ENERGY: ICD-10-CM

## 2024-11-07 DIAGNOSIS — F17.200 TOBACCO DEPENDENCE: ICD-10-CM

## 2024-11-07 DIAGNOSIS — Z00.00 ROUTINE GENERAL MEDICAL EXAMINATION AT A HEALTH CARE FACILITY: Primary | ICD-10-CM

## 2024-11-07 PROCEDURE — 99395 PREV VISIT EST AGE 18-39: CPT | Performed by: FAMILY MEDICINE

## 2024-11-07 PROCEDURE — 99214 OFFICE O/P EST MOD 30 MIN: CPT | Performed by: FAMILY MEDICINE

## 2024-11-07 RX ORDER — PHENTERMINE HYDROCHLORIDE 15 MG/1
15 CAPSULE ORAL EVERY MORNING
Qty: 30 CAPSULE | Refills: 0 | Status: SHIPPED | OUTPATIENT
Start: 2024-11-07

## 2024-11-07 NOTE — PROGRESS NOTES
Laura Tatum is a 29 year old female who is here for   Chief Complaint   Patient presents with    Headache     Constant headaches      Wellness Visit    Follow - Up       HPI:       1. Routine general medical examination at a health care facility  -due for wellness    2. Class 3 severe obesity due to excess calories without serious comorbidity with body mass index (BMI) of 40.0 to 44.9 in adult (HCC)  -metformin and wellbutrin not helping much    3. Other headache syndrome  -complains of headache starting with neck several weeks ago  -getting better        Screening:  Diet: could be better  Exercise: going to gym and at home  Sleep: normal  Depression/Anxiety: see above - denies suicidal or homicidal ideation    Last pap smear: due  Last Mammogram: no fam hx  Colonoscopy: no fam hx      2 kids            Pertinent Fam Hx:    Family History   Problem Relation Age of Onset    Cancer Father         Bladder cancer    Diabetes Son         Type 1       Social History     Socioeconomic History    Marital status: Single   Tobacco Use    Smoking status: Former     Current packs/day: 0.00     Average packs/day: 0.5 packs/day for 0.5 years (0.3 ttl pk-yrs)     Types: Cigarettes     Start date: 3/10/2019     Quit date: 2019     Years since quittin.1    Smokeless tobacco: Never    Tobacco comments:     2-4 cigarettes daily   Vaping Use    Vaping status: Never Used   Substance and Sexual Activity    Alcohol use: Not Currently     Alcohol/week: 0.0 - 1.0 standard drinks of alcohol     Comment: occ    Drug use: Not Currently     Frequency: 1.0 times per week     Types: Cannabis     Comment: Last used 2020    Sexual activity: Yes     Partners: Male     Comment: nuvring   Other Topics Concern    Caffeine Concern No    Stress Concern No    Weight Concern Yes    Special Diet No    Exercise Yes    Seat Belt Yes       Wt Readings from Last 6 Encounters:   24 246 lb 12.8 oz (111.9 kg)   24 245 lb  (111.1 kg)   05/11/24 245 lb 6.4 oz (111.3 kg)   02/10/24 253 lb 9.6 oz (115 kg)   11/09/23 251 lb 6.4 oz (114 kg)   12/01/22 259 lb (117.5 kg)       Patient Active Problem List   Diagnosis    Chronic bilateral low back pain without sciatica       Current Outpatient Medications on File Prior to Visit   Medication Sig Dispense Refill    metFORMIN  MG Oral Tablet 24 Hr Take 1 tablet (500 mg total) by mouth daily with breakfast for 7 days, THEN 2 tablets (1,000 mg total) daily with breakfast. 180 tablet 1    BUPROPION  MG Oral Tablet 24 Hr TAKE 1 TABLET(300 MG) BY MOUTH DAILY 90 tablet 1    Etonogestrel-Ethinyl Estradiol (HALOETTE) 0.12-0.015 MG/24HR Vaginal Ring USE AS DIRECTED. STARTING ON FIRST DAY OF MENSTRUAL CYCLE 3 each 0    ibuprofen 800 MG Oral Tab Take 1 tablet (800 mg total) by mouth every 8 (eight) hours as needed for Pain. 30 tablet 1    ketoconazole 2 % External Shampoo Apply 10 mL topically twice a week. 120 mL 1    ondansetron 4 MG Oral Tablet Dispersible Take 1 tablet (4 mg total) by mouth every 8 (eight) hours as needed for Nausea. (Patient not taking: Reported on 11/7/2024) 30 tablet 1     No current facility-administered medications on file prior to visit.         REVIEW OF SYSTEMS:     GENERAL HEALTH: feels well otherwise. No f/c  NEURO: denies any headaches, LH, dizzyness, LOC, falls  VISION: denies any blurred or double vision  RESPIRATORY: denies shortness of breath, cough, or congestion  CARDIOVASCULAR: denies chest pain, pressure or palpitations  GI: denies abdominal pain, constipation, diarrhea, n/v, BRBPR, melena  : no dysuria, frequency, or hematuria  SKIN: denies any unusual skin lesions or rashes  PSYCH: mood is stable  EXT: denies edema     Wt Readings from Last 6 Encounters:   11/07/24 246 lb 12.8 oz (111.9 kg)   08/17/24 245 lb (111.1 kg)   05/11/24 245 lb 6.4 oz (111.3 kg)   02/10/24 253 lb 9.6 oz (115 kg)   11/09/23 251 lb 6.4 oz (114 kg)   12/01/22 259 lb (117.5 kg)        Allergies   Allergen Reactions    Amoxicillin HIVES    Augmentin [Amoxicillin-Pot Clavulanate] HIVES    Clavulanic Acid HIVES       Family History   Problem Relation Age of Onset    Cancer Father         Bladder cancer    Diabetes Son         Type 1      Past Medical History:    Allergic rhinitis    Anxiety    Depression    DJD (degenerative joint disease)    Obesity    Spinal stenosis      Past Surgical History:   Procedure Laterality Date            Social History:    Social History     Socioeconomic History    Marital status: Single   Tobacco Use    Smoking status: Former     Current packs/day: 0.00     Average packs/day: 0.5 packs/day for 0.5 years (0.3 ttl pk-yrs)     Types: Cigarettes     Start date: 3/10/2019     Quit date: 2019     Years since quittin.1    Smokeless tobacco: Never    Tobacco comments:     2-4 cigarettes daily   Vaping Use    Vaping status: Never Used   Substance and Sexual Activity    Alcohol use: Not Currently     Alcohol/week: 0.0 - 1.0 standard drinks of alcohol     Comment: occ    Drug use: Not Currently     Frequency: 1.0 times per week     Types: Cannabis     Comment: Last used 2020    Sexual activity: Yes     Partners: Male     Comment: nuvring   Other Topics Concern    Caffeine Concern No    Stress Concern No    Weight Concern Yes    Special Diet No    Exercise Yes    Seat Belt Yes           EXAM:   /74 (BP Location: Left arm, Patient Position: Sitting, Cuff Size: adult)   Pulse 97   Temp 97.6 °F (36.4 °C) (Temporal)   Resp 18   Ht 5' 4\" (1.626 m)   Wt 246 lb 12.8 oz (111.9 kg)   LMP 10/14/2024 (Approximate)   SpO2 97%   BMI 42.36 kg/m²     GENERAL: A&O, in no apparent distress  HEENT: atraumatic, MMM, throat is clear  EYES: PERRLA, EOMI  NECK: supple, no thyromegaly  CHEST: no tenderness  LUNGS: clear to auscultation bilateraly, no c/w/r  CARDIO: RRR without murmurs  GI: soft, non-tender, non-distended, no appreciable hsm, bs throughout  NEURO: CN  II-XII grossly intact  PSYCH: pleasant  MUSCULOSKELETAL: normal gait, no appreciable defects  EXTREMITIES: no cyanosis, clubbing or edema; pain in lumbar paraspinals, neg SLR b/l  SKIN: no rashes,no suspicious lesions    Problem focused exam (for problems outside of physical, if any):    The ASCVD Risk score (Susan WHARTON, et al., 2019) failed to calculate for the following reasons:    The 2019 ASCVD risk score is only valid for ages 40 to 79    ASSESSMENT AND PLAN:     Health Maintenance  -We discussed the following:  Healthy diet and exercise, cancer screening, self breast exams and calcium and vitamin D supplementation.    -Fasting labs ordered  Appt with OBGYN for PAP next month    Stress Management: counseled    1. Routine general medical examination at a health care facility  - CBC With Differential With Platelet; Future  - Comp Metabolic Panel (14); Future  - Lipid Panel; Future  - TSH W Reflex To Free T4; Future    2. Class 3 severe obesity due to excess calories without serious comorbidity with body mass index (BMI) of 40.0 to 44.9 in adult (HCC)  -c/w metformin and wellbutrin  -add phentermine  -c/w lifestyle changes  -f/u in 3 months    - Phentermine HCl 15 MG Oral Cap; Take 1 capsule (15 mg total) by mouth every morning.  Dispense: 30 capsule; Refill: 0  - CBC With Differential With Platelet; Future  - Comp Metabolic Panel (14); Future  - Lipid Panel; Future  - TSH W Reflex To Free T4; Future  - Vitamin B12 [E]; Future  - Vitamin D [E]; Future    3. Other headache syndrome  -better  -likely tension headache  -stretching daily    4. Tobacco dependence  -c/w wellbutrin  -continue to work on cutting back    5. Vitamin D deficiency  - Vitamin D [E]; Future    6. Low energy  - Vitamin B12 [E]; Future  - Vitamin D [E]; Future              Orders This Visit:  Orders Placed This Encounter   Procedures    CBC With Differential With Platelet    Comp Metabolic Panel (14)    Lipid Panel    TSH W Reflex To Free T4     Vitamin B12 [E]    Vitamin D [E]       Meds This Visit:  Requested Prescriptions     Signed Prescriptions Disp Refills    Phentermine HCl 15 MG Oral Cap 30 capsule 0     Sig: Take 1 capsule (15 mg total) by mouth every morning.       Imaging & Referrals:  None       The patient indicates understanding of these issues and agrees to the plan.  The patient is asked to return in prn.  PO STROUD MD    I spent a total of 30 minutes, more than half of which was spent counseling/coordinating care regarding back pain, pimples (outside of time for wellness)

## 2024-12-11 ENCOUNTER — OFFICE VISIT (OUTPATIENT)
Dept: OBGYN CLINIC | Facility: CLINIC | Age: 29
End: 2024-12-11
Payer: MEDICAID

## 2024-12-11 VITALS
DIASTOLIC BLOOD PRESSURE: 68 MMHG | SYSTOLIC BLOOD PRESSURE: 110 MMHG | WEIGHT: 245.19 LBS | HEIGHT: 64 IN | HEART RATE: 95 BPM | BODY MASS INDEX: 41.86 KG/M2

## 2024-12-11 DIAGNOSIS — Z12.4 CERVICAL CANCER SCREENING: ICD-10-CM

## 2024-12-11 DIAGNOSIS — Z01.419 WELL WOMAN EXAM WITH ROUTINE GYNECOLOGICAL EXAM: Primary | ICD-10-CM

## 2024-12-11 PROCEDURE — 88175 CYTOPATH C/V AUTO FLUID REDO: CPT | Performed by: NURSE PRACTITIONER

## 2024-12-11 PROCEDURE — 87624 HPV HI-RISK TYP POOLED RSLT: CPT | Performed by: NURSE PRACTITIONER

## 2024-12-11 PROCEDURE — 99385 PREV VISIT NEW AGE 18-39: CPT | Performed by: NURSE PRACTITIONER

## 2024-12-11 NOTE — PROGRESS NOTES
Here for new gynecology visit.  29 year old G 2 P 2.  Patient's last menstrual period was 2024 (exact date)..     Here for Annual Gynecologic Exam.     Menses Q 28-30 days for 4-5 days.  Nuvaring for contraception.    Her PCP is in the process of giving her weight loss medications. She went from Trulicity to Metformin and Phentermine in September. Her menses got much lighter for 2 cycles.    Last pap smear was in  and it was normal.  No hx of abnormal pap smears.     OB Hx:  2 .    Family gyn hx:  neg.   Family breast hx: paternal grandmother.    Screening labs with PCP this year.    Past Medical History:    Allergic rhinitis    Anxiety    Depression    DJD (degenerative joint disease)    Obesity    Spinal stenosis     Past Surgical History:   Procedure Laterality Date           Medications Ordered Prior to Encounter[1]    OB History    Para Term  AB Living   2             SAB IAB Ectopic Multiple Live Births                  # Outcome Date GA Lbr Sebastian/2nd Weight Sex Type Anes PTL Lv   2             1               ROS:    General:  No wt loss, wt gain, appetite changes.  Breasts:  No pain, lumps or secretions.  :   No urgency, frequency, FLACO, bladder problems in past.               /68   Pulse 95   Ht 64\"   Wt 245 lb 3.2 oz (111.2 kg)   LMP 2024 (Exact Date)   BMI 42.09 kg/m²     NECK:  Thyroid normal size without nodules. No adenopathy.  LUNGS:  Clear to auscultation.  COR;  Regular rate and rhythm.    BREASTS:  symmetrical in shape. No masses, tenderness, secretions, or adenopathy.  ABDOMEN:  Soft and non tender.  No organomegaly.  No inguinal adenopathy.  VULVA:  No lesions or erythema.  VAGINA:  No lesions, small to moderate cream discharge.  CERVIX:  No lesions or CMT.  Pap smear done with HPV.  UTERUS:  anteflexed and normal size.  ADNEXA:  No pain or masses.    IMP/PLAN:    1. Well woman exam with routine gynecological exam  Regular self breast  exams recommended  Call when she finds out the age of onset for her paternal grandmothers diagnosis of breast cancer which she just revealed    2. Cervical cancer screening  - ThinPrep PAP with HPV Reflex Request; Future    See in 1 year/ prn.         [1]   Current Outpatient Medications on File Prior to Visit   Medication Sig Dispense Refill    Phentermine HCl 15 MG Oral Cap Take 1 capsule (15 mg total) by mouth every morning. 30 capsule 0    metFORMIN  MG Oral Tablet 24 Hr Take 1 tablet (500 mg total) by mouth daily with breakfast for 7 days, THEN 2 tablets (1,000 mg total) daily with breakfast. 180 tablet 1    BUPROPION  MG Oral Tablet 24 Hr TAKE 1 TABLET(300 MG) BY MOUTH DAILY 90 tablet 1    Etonogestrel-Ethinyl Estradiol (HALOETTE) 0.12-0.015 MG/24HR Vaginal Ring USE AS DIRECTED. STARTING ON FIRST DAY OF MENSTRUAL CYCLE 3 each 0    ondansetron 4 MG Oral Tablet Dispersible Take 1 tablet (4 mg total) by mouth every 8 (eight) hours as needed for Nausea. 30 tablet 1    ibuprofen 800 MG Oral Tab Take 1 tablet (800 mg total) by mouth every 8 (eight) hours as needed for Pain. 30 tablet 1    ketoconazole 2 % External Shampoo Apply 10 mL topically twice a week. 120 mL 1     No current facility-administered medications on file prior to visit.

## 2024-12-19 ENCOUNTER — PATIENT MESSAGE (OUTPATIENT)
Dept: FAMILY MEDICINE CLINIC | Facility: CLINIC | Age: 29
End: 2024-12-19

## 2024-12-19 DIAGNOSIS — E66.01 CLASS 3 SEVERE OBESITY DUE TO EXCESS CALORIES WITHOUT SERIOUS COMORBIDITY WITH BODY MASS INDEX (BMI) OF 40.0 TO 44.9 IN ADULT (HCC): Primary | ICD-10-CM

## 2024-12-19 DIAGNOSIS — E66.813 CLASS 3 SEVERE OBESITY DUE TO EXCESS CALORIES WITHOUT SERIOUS COMORBIDITY WITH BODY MASS INDEX (BMI) OF 40.0 TO 44.9 IN ADULT (HCC): Primary | ICD-10-CM

## 2024-12-19 LAB
.: NORMAL
.: NORMAL

## 2024-12-20 LAB — HPV E6+E7 MRNA CVX QL NAA+PROBE: NEGATIVE

## 2024-12-20 RX ORDER — PHENTERMINE HYDROCHLORIDE 30 MG/1
30 CAPSULE ORAL EVERY MORNING
Qty: 30 CAPSULE | Refills: 0 | Status: SHIPPED | OUTPATIENT
Start: 2024-12-20

## 2025-07-10 ENCOUNTER — MED REC SCAN ONLY (OUTPATIENT)
Dept: FAMILY MEDICINE CLINIC | Facility: CLINIC | Age: 30
End: 2025-07-10

## 2025-07-14 RX ORDER — ETONOGESTREL AND ETHINYL ESTRADIOL VAGINAL .015; .12 MG/D; MG/D
RING VAGINAL
Refills: 0 | OUTPATIENT
Start: 2025-07-14

## 2025-07-14 RX ORDER — ETONOGESTREL AND ETHINYL ESTRADIOL VAGINAL .015; .12 MG/D; MG/D
RING VAGINAL
Qty: 3 EACH | Refills: 0 | Status: SHIPPED | OUTPATIENT
Start: 2025-07-14

## (undated) NOTE — LETTER
Date: 11/3/2020    Patient Name: Yulia Jalloh          To Whom it may concern: The above patient was seen at the [facility] for treatment of a medical condition. This patient should be excused from attending work for 11/3/20.     Garrison Essex

## (undated) NOTE — LETTER
Date: 7/29/2019    Patient Name: Jayme Miguel          To Whom it may concern: The above patient was seen at the [facility] for treatment of a medical condition.     Her dog, Kristina Torres, which is a whippet terrier mix, is used for her emot

## (undated) NOTE — MR AVS SNAPSHOT
After Visit Summary   4/30/2021    Neto Junior    MRN: [id number]           Visit Information     Date & Time  4/30/2021  9:00 AM Provider  [facility] Department  [facility] [address] [facility]  Dept.  Phone  [phone] US PELVIS ([id number]) [80960 CPT(R)]  4/30/2021 (Approximate) 4/30/2022      Future Appointments        Provider Department    5/11/2021 3:00 PM PFS  [address] [facility]      Follow-up Instructions    Return if symptoms w immediate attention.  Average cost  $70*   [facility]  Monday – Friday  8:00 am – 8:00 pm   Saturday – Sunday  8:00 am – 4:00 pm    WALK-IN CARE  Primary Care Providers  Treatment for acute illness   or injury that are

## (undated) NOTE — ED AVS SNAPSHOT
Magy Moss   MRN: [id number]    Department:  [facility] Emergency Department   Date of Visit:  1/24/2018           Disclosure     Insurance plans vary and the physician(s) referred by the ER may not be covered by your plan.  Please contact your tell this physician (or your personal doctor if your instructions are to return to your personal doctor) about any new or lasting problems. The primary care or specialist physician will see patients referred from the [facility] Emergency Department.  Scot Lindsey

## (undated) NOTE — ED AVS SNAPSHOT
Yulia Jalloh   MRN: [id number]    Department:  [facility] Emergency Department in [facility]   Date of Visit:  7/16/2018           Disclosure     Insurance plans vary and the physician(s) referred by the ER may not be covered by your plan.  Please con tell this physician (or your personal doctor if your instructions are to return to your personal doctor) about any new or lasting problems. The primary care or specialist physician will see patients referred from the [facility] Emergency Department.  Jonathan Estevez

## (undated) NOTE — LETTER
Date: 11/7/2024    Patient Name: Laura Tatum          To Whom it may concern:    The above patient was seen at [facility] for treatment of a medical condition.    This patient should be excused from attending work/school on 11/7/24.      The patient may return to work/school on 11/8/24 with the following limitations - none.        Sincerely,    PO STROUD MD

## (undated) NOTE — ED AVS SNAPSHOT
Joe Tee   MRN: [id number]    Department:  [facility] Emergency Department in [place]   Date of Visit:  5/23/2019           Disclosure     Insurance plans vary and the physician(s) referred by the ER may not be covered by your plan.  Please con tell this physician (or your personal doctor if your instructions are to return to your personal doctor) about any new or lasting problems. The primary care or specialist physician will see patients referred from the [facility] Emergency Department.  Julia Palma

## (undated) NOTE — ED AVS SNAPSHOT
Yue Durant   MRN: [id number]    Department:  [facility] Emergency Department in [place]   Date of Visit:  1/22/2018           Disclosure     Insurance plans vary and the physician(s) referred by the ER may not be covered by your plan.  Please contact tell this physician (or your personal doctor if your instructions are to return to your personal doctor) about any new or lasting problems. The primary care or specialist physician will see patients referred from the [facility] Emergency Department.  Deshaun Briggs